# Patient Record
Sex: MALE | Employment: OTHER | URBAN - METROPOLITAN AREA
[De-identification: names, ages, dates, MRNs, and addresses within clinical notes are randomized per-mention and may not be internally consistent; named-entity substitution may affect disease eponyms.]

---

## 2021-08-23 ENCOUNTER — HOSPITAL ENCOUNTER (OUTPATIENT)
Dept: WOUND CARE | Age: 86
Discharge: HOME | End: 2021-08-23
Payer: MEDICARE

## 2021-08-23 VITALS
HEIGHT: 69 IN | HEART RATE: 60 BPM | RESPIRATION RATE: 18 BRPM | TEMPERATURE: 98.4 F | WEIGHT: 135 LBS | DIASTOLIC BLOOD PRESSURE: 62 MMHG | SYSTOLIC BLOOD PRESSURE: 125 MMHG | BODY MASS INDEX: 19.99 KG/M2

## 2021-08-23 DIAGNOSIS — E44.0 MODERATE PROTEIN-CALORIE MALNUTRITION (HCC): ICD-10-CM

## 2021-08-23 DIAGNOSIS — L89.613 PRESSURE INJURY OF RIGHT HEEL, STAGE 3 (HCC): ICD-10-CM

## 2021-08-23 PROBLEM — E46 MALNUTRITION (HCC): Status: ACTIVE | Noted: 2021-08-23

## 2021-08-23 PROBLEM — N18.9 CKD (CHRONIC KIDNEY DISEASE): Status: ACTIVE | Noted: 2021-08-23

## 2021-08-23 PROBLEM — I50.9 CHF (CONGESTIVE HEART FAILURE) (HCC): Status: ACTIVE | Noted: 2021-08-23

## 2021-08-23 PROCEDURE — 99203 OFFICE O/P NEW LOW 30 MIN: CPT | Performed by: SURGERY

## 2021-08-23 PROCEDURE — 11042 DBRDMT SUBQ TIS 1ST 20SQCM/<: CPT

## 2021-08-23 PROCEDURE — 74011000250 HC RX REV CODE- 250: Performed by: SURGERY

## 2021-08-23 PROCEDURE — 11042 DBRDMT SUBQ TIS 1ST 20SQCM/<: CPT | Performed by: SURGERY

## 2021-08-23 PROCEDURE — 99203 OFFICE O/P NEW LOW 30 MIN: CPT

## 2021-08-23 RX ADMIN — Medication: at 14:49

## 2021-08-24 ENCOUNTER — DOCUMENTATION ONLY (OUTPATIENT)
Dept: WOUND CARE | Age: 86
End: 2021-08-24

## 2021-08-24 NOTE — H&P
[facility]  HISTORY AND PHYSICAL    Name:  Raimundo Duque  MR#:  [id number]  :  1934  ACCOUNT #:  [de-identified]  ADMIT DATE:  2021      HISTORY OF PRESENT ILLNESS:  The patient is an 66-year-old man who was referred to [address] [place] [address] regarding a pressure ulcer on the right heel. The patient was hospitalized in 2021 and underwent transfemoral heart valve clipping. He is reported to have developed the pressure site at that time. Presently, the patient can walk short distances in his house with a walker. He goes up and downstairs once per day. The patient is fairly weak. His appetite is only fair. The patient does not have history of diabetes. He does have history of congestive heart failure. He also has history of MI about 7 years ago and had coronary angioplasty at that time. He is reported to have severe coronary artery disease. The patient does have history of pacemaker and defibrillator insertion. The patient is able to sleep in bed at night. He has been instructed to elevate his legs much of the time during the day because of leg swelling. The patient apparently reportedly has some type of padded boot for both feet, which he wears at night. The patient and his wife did not bring them with him at this visit. The patient has never smoked. Reported weight 135 pounds, height 5 feet 9 inches. PHYSICAL EXAMINATION:  VITAL SIGNS:  Blood pressure 125/62, pulse 60, respirations 18, temperature 98.4. GENERAL:  The patient is an alert and elderly man, in no acute distress. HEAD AND NECK:  Examination showed no jaundice. LUNGS:  Clear bilaterally without rales, rhonchi or wheezes. HEART:  Regular with 2/6 systolic murmur heard best on the right sternal border. NEUROLOGIC  The patient is alert and oriented. He moves all extremities equally.   Facial movement is symmetrical.  Speech is normal.  EXTREMITIES:  Examination of the left lower extremity reveals 2+ dorsalis pedis pulse. There is a slight callus at the left posterior heel, which was reported to be a site of a previous eschar. Examination of the left lower extremity revealed trace pitting edema. There were 2+ right dorsalis pedis and posterior tibial pulses. There was a wound on the posterior right heel, which was 2.5 x 3 x 0.4 cm in dimension with loosely adherent necrotic eschar. Surrounding skin was free of erythema. There was no undermining. I recommended debridement of the necrotic material on the patient's wound. Risks versus benefits were reviewed. The site was marked. 15-blade scalpel and scissors and forceps were utilized. I removed the necrotic eschar down to bleeding tissue. Small portions of adherent nonviable-appearing fat were left in place. I explained to the patient and his wife that complete offloading of the posterior right heel was essential at all times. If he was sitting upright, he could have his feet flat on the floor without limit. If he is either lying down or sitting in a chair with legs elevated, he would need to have a pillow under both calves to float both heels off any surface. He can utilize the hospital supplied boots at night when sleeping, but his wife should confirm that this allows for floating the heels off of any surface. Dressing ordered:  Apply Santyl nickel thick over the ulcer, cover with normal saline and moist gauze and dry gauze and roll gauze from below ankle to above ankle. Dressing to be changed daily. The patient is able to bathe with the dressing removed. Home health will be ordered three times per week for assistance with wound care. The patient's wife will change the dressing on days when Home Health is not coming. The patient and his wife will bring the padded boots at their next visit with me in the [address] [place] [address]. The patient will follow up in [address] [place] [address] in 3 weeks.     FINAL DIAGNOSIS:  Pressure ulcer posterior right heel, stage III.       Morelia Bradley MD      [id number]  D:  08/23/2021 17:14  T:  08/23/2021 21:44  JOB #:  [id number]

## 2021-09-13 ENCOUNTER — HOSPITAL ENCOUNTER (OUTPATIENT)
Dept: WOUND CARE | Age: 86
Discharge: HOME | End: 2021-09-13
Payer: MEDICARE

## 2021-09-13 VITALS
SYSTOLIC BLOOD PRESSURE: 125 MMHG | DIASTOLIC BLOOD PRESSURE: 62 MMHG | TEMPERATURE: 98.4 F | RESPIRATION RATE: 18 BRPM | HEART RATE: 60 BPM

## 2021-09-13 DIAGNOSIS — L89.613 PRESSURE INJURY OF RIGHT HEEL, STAGE 3 (HCC): ICD-10-CM

## 2021-09-13 PROCEDURE — 99213 OFFICE O/P EST LOW 20 MIN: CPT | Performed by: SURGERY

## 2021-09-13 PROCEDURE — 74011000250 HC RX REV CODE- 250: Performed by: SURGERY

## 2021-09-13 PROCEDURE — 99213 OFFICE O/P EST LOW 20 MIN: CPT

## 2021-09-13 RX ORDER — PRAMIPEXOLE DIHYDROCHLORIDE 0.12 MG/1
0.12 TABLET ORAL
COMMUNITY

## 2021-09-13 RX ORDER — ATORVASTATIN CALCIUM 80 MG/1
80 TABLET, FILM COATED ORAL
COMMUNITY

## 2021-09-13 RX ORDER — CYANOCOBALAMIN 1000 UG/ML
1000 INJECTION, SOLUTION INTRAMUSCULAR; SUBCUTANEOUS ONCE
COMMUNITY

## 2021-09-13 RX ORDER — CLONAZEPAM 1 MG/1
1 TABLET ORAL
COMMUNITY

## 2021-09-13 RX ORDER — FINASTERIDE 5 MG/1
5 TABLET, FILM COATED ORAL DAILY
COMMUNITY

## 2021-09-13 RX ORDER — TORSEMIDE 5 MG/1
10 TABLET ORAL DAILY
COMMUNITY
End: 2022-01-31

## 2021-09-13 RX ORDER — AMIODARONE HYDROCHLORIDE 200 MG/1
200 TABLET ORAL DAILY
COMMUNITY

## 2021-09-13 RX ORDER — ASCORBIC ACID 500 MG
1000 TABLET ORAL DAILY
COMMUNITY
End: 2022-01-31

## 2021-09-13 RX ORDER — METHENAMINE HIPPURATE 1000 MG/1
1 TABLET ORAL DAILY
COMMUNITY
End: 2022-01-31

## 2021-09-13 RX ORDER — LEVOTHYROXINE SODIUM 125 UG/1
125 TABLET ORAL
COMMUNITY

## 2021-09-13 RX ADMIN — Medication: at 13:45

## 2021-09-13 NOTE — PROGRESS NOTES
HISTORY OF PRESENT ILLNESS:  The patient is an 79-year-old man who was referred to [address] [place] [address] regarding a pressure ulcer on the right heel. The patient was hospitalized in 04/2021 and underwent transfemoral heart valve clipping. He is reported to have developed the pressure site at that time.     Presently, the patient can walk short distances in his house with a walker. He goes up and downstairs once per day. The patient is fairly weak. His appetite is only fair.     The patient does not have history of diabetes. He does have history of congestive heart failure. He also has history of MI about 7 years ago and had coronary angioplasty at that time. He is reported to have severe coronary artery disease. The patient does have history of pacemaker and defibrillator insertion.     The patient is able to sleep in bed at night. He has been instructed to elevate his legs much of the time during the day because of leg swelling.     The patient reportedly has some type of padded boot for both feet, which he wears at night. The patient and his wife did not bring them with him at this visit.     The patient has never smoked. The patient has developed a rash.     Reported weight 135 pounds, height 5 feet 9 inches.     PHYSICAL EXAMINATION:    GENERAL:  The patient is an alert and elderly man who appears chronically ill,  in no acute distress. EXTREMITIES:      The patient has red rash on anterior right lower leg not in an area of his dressing. Examination of the left lower extremity reveals 2+ dorsalis pedis pulse. There is a slight callus at the left posterior heel, which was reported to be a site of a previous eschar.     Examination of the left lower extremity revealed trace pitting edema. There were 2+ right dorsalis pedis and posterior tibial pulses.   There was a wound on the posterior right heel, which was 2.1 x 3 x 0.3 cm in dimension with granulation 40% and exposed yellow fat and minimal slough. Skin edges well sealed to the wound. Surrounding skin was free of erythema. There was no undermining.           Right heel wound improved. Patient should see his primary MD regarding rash. There is no erythema or rash around the wound, so I don't think the rash is likely to be a reaction to the Santyl.     Continue offloading the right heel. The patient's wife appears to be doing a good job with this.     Dressing ordered:  Apply Santyl nickel thick over the ulcer, cover with normal saline and moist gauze and dry gauze and roll gauze from below ankle to above ankle. Dressing to be changed daily.     The patient is able to bathe with the dressing removed.     Home health will be ordered three times per week for assistance with wound care.   The patient's wife will change the dressing on days when [facility] is not coming.     The patient and his wife will bring the padded boots at their next visit with me in the [address] [place] [address].     The patient will follow up in [address] [place] [address] in 2-3 weeks.     FINAL DIAGNOSIS:  Pressure ulcer posterior right heel, stage III.    W16.880        Lucius Braga MD

## 2021-09-13 NOTE — WOUND CARE
09/13/21 1430   Wound Heel Lateral;Right #1 08/23/21   Date First Assessed/Time First Assessed: 08/23/21 1431   Present on Hospital Admission: Yes  Wound Approximate Age at First Assessment (Weeks): (c)   Primary Wound Type: Pressure Injury  Location: Heel  Wound Location Orientation: Lateral;Right  Wound. .. Dressing/Treatment Gauze dressing/dressing sponge  (santyl  nickel thick dry gauze rolled gauze, netting)   Discharge Condition: Stable     Pain: 0    Ambulatory Status: Walker     Discharge Destination: home    Transportation: Car    Accompanied by: spouse    Discharge instructions reviewed with Family/Caregiver  and copy or written instructions have been provided. All questions/concerns have been addressed at this time.

## 2021-09-13 NOTE — DISCHARGE INSTRUCTIONS
Discharge Instructions for  [facility]  [address] [place], [address] [address]  Telephone: [phone]    NAME:  Hamzah Mario OF BIRTH:  1934  DATE:  8/23/2021    [x] Home Healthcare: TO:  ALL ABOUT CARE       Utilize heel boots to offload heels if they do not press on wound or use pillow to float heel    Wound Cleansing:   Do not scrub or use excessive force. DO NOT SOAK  Cleanse wound prior to applying a clean dressing with:  [] Normal Saline   [] Keep Wound Dry in Shower      [] Wound Cleanser   [x] Cleanse wound with Mild Soap & Water    [x] May Shower at Discharge: remove dressing 1st, redress wound right after  [] Do not shower  [] cleanse with baby shampoo lather leave 2-3 then rinse    Topical Treatments:  Do not apply lotions, creams, or ointments to wound bed unless directed. [] Apply moisturizing lotion A&D ointment to skin surrounding the wound prior to dressing change.  [] Bactroban/Mupirocin  [] Gentamicin ointment    [] Gentian violet to wound bed and periwound  [] Other:     Dressings:                   Wound Location Right Lateral Heel     Apply Primary Dressing:      [x] Santyl - nickel thick     Cover and Secure with:  [x] Gauze moistened with saline, then Dry Gauze on top [] ABD [] exudry     [] Mauricio [x] Kerlix/Rolled Gauze [] Mepilex Border  [] Ace Wrap [x] Roll Tape    [] Other:        Change dressing:   [x] Daily       [] Every Other Day   [] Three times per week  [] Once a week   [] Do Not Change Dressing     [x] Other: wound care three times a week with home health     Edema Control: Every morning immediately when getting up should be applied to affected leg(s) from mid foot to knee making sure to cover the heel. Remove every night before going to bed if desired.   Apply: [] Compression Stocking      []Right Leg           []Left Leg   [] Tubigrip {tubigrip:97624}   [] Right Leg Single Layer  [] Right Leg Double Layer [] Left Leg Single Layer [] Left Leg Double Layer    Compression: Do not get leg(s) with wrap wet. If wraps become too tight call the center or completely remove the wrap. Apply: [] Three Layer Compression Wrap  []RightLeg []Left Leg  [] Four layer Compression Wrap      []RightLeg []Left Leg   []  Unna's boot                                  [] Right Leg   [] Left Leg       [x] Elevate leg(s) above the level of the heart when sitting. [x] Avoid prolonged standing in one place. Off-Loading:   [x] Off-loading when [] walking  [x] in bed [x] sitting       HEEL BOOTS OR FLOAT ON PILLOWS- heel should not touch bed or ottoman at all    Dietary:  [x] Diet as tolerated [] Diabetic Diet   [x] Increase Protein: examples (Meat, cheese, eggs, greek yogurt, fish, nuts)   [] Miguel Angel Therapeutic Nutrition Powder  [x] Other: Bowl of ice cream every afternoon per Dr. Toni Perla to increase body weight and nutritional status  [] Dial a Dietician : Call [facility] at [phone] enter code [phone]) when prompted. M-F 9am-5pm EST. Return Appointment:  [] Nurse Visit at wound center in *** days   [x] Return Appointment: With Dr. Gooden Heading in 3 week(s)  [] Ordered tests:      Electronically signed on 8/23/2021 at [address] [place] [address] Information: Should you experience any significant changes in your wound(s) or have questions about your wound care, please contact the [facility] at [address] [place] [address] 8:00 am - 4:30. If you need help with your wound outside these hours and cannot wait until we are again available, contact your PCP or go to the hospital emergency room. PLEASE NOTE: IF YOU ARE UNABLE TO OBTAIN WOUND SUPPLIES, CONTINUE TO USE THE SUPPLIES YOU HAVE AVAILABLE UNTIL YOU ARE ABLE TO REACH US. IT IS MOST IMPORTANT TO KEEP THE WOUND COVERED AT ALL TIMES.      Physician Signature:_______________________  Dr. Gooden Heading

## 2021-09-13 NOTE — WOUND CARE
09/13/21 1341   Anesthetic   Anesthetic 4% Lidocaine Liquid Topical   Right Leg Edema Point of Measurement   Leg circumference 30.5 cm   Ankle circumference 23 cm   RLE Peripheral Vascular    Capillary Refill Less than/equal to 3 seconds   Color Red   Temperature Cool   Pedal Pulse Palpable   Wound Heel Lateral;Right #1 08/23/21   Date First Assessed/Time First Assessed: 08/23/21 1431   Present on Hospital Admission: Yes  Wound Approximate Age at First Assessment (Weeks): (c)   Primary Wound Type: Pressure Injury  Location: Heel  Wound Location Orientation: Lateral;Right  Wound. ..    Wound Image    Wound Length (cm) 2.1 cm   Wound Width (cm) 3 cm   Wound Depth (cm) 0.3 cm   Wound Surface Area (cm^2) 6.3 cm^2   Change in Wound Size % 16   Wound Volume (cm^3) 1.89 cm^3   Wound Healing % 37   Wound Assessment Slough;Pink/red  (exudate)   Drainage Amount Moderate   Drainage Description Serosanguinous   Wound Odor None   Shivani-Wound/Incision Assessment Blanchable erythema   Edges Flat/open edges   Pain 1   Pain Scale 1 Numeric (0 - 10)   Pain Intensity 1 0     Visit Vitals  /62 (BP 1 Location: Right upper arm, BP Patient Position: Sitting)   Pulse 60   Temp 98.4 °F (36.9 °C)   Resp 18

## 2021-10-04 ENCOUNTER — HOSPITAL ENCOUNTER (OUTPATIENT)
Dept: WOUND CARE | Age: 86
Discharge: HOME | End: 2021-10-04
Payer: MEDICARE

## 2021-10-04 VITALS
HEART RATE: 76 BPM | DIASTOLIC BLOOD PRESSURE: 79 MMHG | TEMPERATURE: 98.9 F | RESPIRATION RATE: 18 BRPM | SYSTOLIC BLOOD PRESSURE: 122 MMHG

## 2021-10-04 DIAGNOSIS — L89.613 PRESSURE INJURY OF RIGHT HEEL, STAGE 3 (HCC): ICD-10-CM

## 2021-10-04 PROCEDURE — 74011000250 HC RX REV CODE- 250: Performed by: SURGERY

## 2021-10-04 PROCEDURE — 99213 OFFICE O/P EST LOW 20 MIN: CPT

## 2021-10-04 PROCEDURE — 99213 OFFICE O/P EST LOW 20 MIN: CPT | Performed by: SURGERY

## 2021-10-04 RX ORDER — LIDOCAINE HYDROCHLORIDE 20 MG/ML
JELLY TOPICAL
Status: COMPLETED | OUTPATIENT
Start: 2021-10-04 | End: 2021-10-04

## 2021-10-04 RX ADMIN — LIDOCAINE HYDROCHLORIDE: 20 JELLY TOPICAL at 14:00

## 2021-10-04 NOTE — WOUND CARE
10/04/21 1452   Wound Heel Lateral;Right #1 08/23/21   Date First Assessed/Time First Assessed: 08/23/21 1431   Present on Hospital Admission: Yes  Wound Approximate Age at First Assessment (Weeks): (c)   Primary Wound Type: Pressure Injury  Location: Heel  Wound Location Orientation: Lateral;Right  Wound. .. Dressing/Treatment ABD pad;Gauze dressing/dressing sponge;Roll gauze;Tape/Soft cloth adhesive tape  (Santyl )   Discharge Condition: Stable     Pain: 0    Ambulatory Status: Walker     Discharge Destination: Home     Transportation: Car    Accompanied by: Family/Caregiver     Discharge instructions reviewed with Family/Caregiver  and copy or written instructions have been provided. All questions/concerns have been addressed at this time.

## 2021-10-04 NOTE — WOUND CARE
10/04/21 1357   Wound Heel Lateral;Right #1 08/23/21   Date First Assessed/Time First Assessed: 08/23/21 1431   Present on Hospital Admission: Yes  Wound Approximate Age at First Assessment (Weeks): (c)   Primary Wound Type: Pressure Injury  Location: Heel  Wound Location Orientation: Lateral;Right  Wound. ..    Wound Image    Wound Length (cm) 2 cm   Wound Width (cm) 2.5 cm   Wound Depth (cm) 0.3 cm   Wound Surface Area (cm^2) 5 cm^2   Change in Wound Size % 33.33   Wound Volume (cm^3) 1.5 cm^3   Wound Healing % 50   Wound Assessment Slough   Drainage Amount Moderate   Drainage Description Serosanguinous   Wound Odor None   Shivani-Wound/Incision Assessment Blanchable erythema   Edges Flat/open edges   Wound Thickness Description Full thickness     Visit Vitals  /79 (BP Patient Position: Sitting)   Pulse 76   Temp 98.9 °F (37.2 °C)   Resp 18

## 2021-10-04 NOTE — PROGRESS NOTES
HISTORY OF PRESENT ILLNESS:  The patient is an 15-year-old man who was referred to [address] [place] [address] regarding a pressure ulcer on the right heel.  The patient was hospitalized in 04/2021 and underwent transfemoral heart valve clipping.  He is reported to have developed the pressure site at that time. The patient was first seen at the [address] [place] [address] on 8/23/2021. Presently, the patient can walk short distances in his house with a walker. [facility] goes up and downstairs once per day.  The patient is fairly weak.  His appetite is fair, improved a little.     The patient does not have history of diabetes. [facility] does have history of congestive heart failure.  He also has history of MI about 7 years ago and had coronary angioplasty at that time. [facility] is reported to have severe coronary artery disease.  The patient does have history of pacemaker and defibrillator insertion. His cardiologist is Dr Jeff Silva.     The patient is able to sleep in bed at night. [facility] has been instructed to elevate his legs much of the time during the day because of leg swelling.     The patient reportedly has some type of padded boot for both feet, which he wears during the day.  At night, the patient reports he has neuropathy which prevents him from using any padded boot. The patient and his wife did not bring his booties with him at this visit.     The patient has never smoked. Current dressing as of 8/23/2021:  Apply Santyl nickel thick over the ulcer, cover with normal saline and moist gauze and dry gauze and roll gauze from below ankle to above ankle.  Dressing to be changed daily. Prior rash has subsided.       Reported weight 135 pounds, height 5 feet 9 inches.     PHYSICAL EXAMINATION:     GENERAL:  The patient is an alert and elderly man who appears chronically ill,  in no acute distress. Looks stronger than first visit.     EXTREMITIES:       Examination of the left lower extremity reveals 2+ dorsalis pedis pulse. Arnoldo Miranda is a slight callus at the left posterior heel, which was reported to be a site of a previous eschar.     Examination of the left lower extremity revealed trace pitting edema.  There were 2+ right dorsalis pedis and posterior tibial pulses.  There was a wound on the posterior right heel, which was 2 x 2.5 x 0.3 cm in dimension with granulation 50% and exposed yellow fat and minimal slough. Skin edges well sealed to the wound.  Surrounding skin was free of erythema.  There was no undermining.            Right heel wound improving slowly.                Continue offloading the right heel. The patient's wife appears to be doing a good job with this.     Dressing ordered:  Apply Santyl nickel thick over the ulcer, cover with normal saline and moist gauze and dry gauze and roll gauze from below ankle to above ankle.  Dressing to be changed daily.     The patient is able to bathe with the dressing removed.  [address] [place] [address] will be ordered three times per week for assistance with wound care.  The patient's wife will change the dressing on days when [facility] is not coming.     The patient and his wife were instructed to bring the padded boots at their next visit with me in the [address] [place] [address].     The patient will follow up in [address] [place] [address] in 3 weeks.     FINAL DIAGNOSIS:  Pressure ulcer posterior right heel, stage III.     L[address]. [address]  Talita Crespo MD

## 2021-10-04 NOTE — DISCHARGE INSTRUCTIONS
Discharge Instructions for  [facility]  [address] [place], [address] [address]  Telephone: [phone]    NAME:  Lilliana Edmond OF BIRTH:  1934  DATE:  9/13/2021    [x] Home Healthcare: TO:  ALL ABOUT CARE       Utilize heel boots to offload heels if they do not press on wound or use pillow to float heel    Wound Cleansing:   Do not scrub or use excessive force. DO NOT SOAK  Cleanse wound prior to applying a clean dressing with:  [] Normal Saline   [] Keep Wound Dry in Shower      [] Wound Cleanser   [x] Cleanse wound with Mild Soap & Water    [x] May Shower at Discharge: remove dressing 1st, redress wound right after  [] Do not shower  [] cleanse with baby shampoo lather leave 2-3 then rinse    Topical Treatments:  Do not apply lotions, creams, or ointments to wound bed unless directed. [] Apply moisturizing lotion A&D ointment to skin surrounding the wound prior to dressing change.  [] Bactroban/Mupirocin  [] Gentamicin ointment    [] Gentian violet to wound bed and periwound  [] Other:     Dressings:                   Wound Location Right Lateral Heel     Apply Primary Dressing:      [x] Santyl - nickel thick     Cover and Secure with:  [x] Gauze moistened with saline, then Dry Gauze on top [] ABD [] exudry     [] Mauricio [x] Kerlix/Rolled Gauze [] Mepilex Border  [] Ace Wrap [x] Roll Tape    [] Other:        Change dressing:   [x] Daily       [] Every Other Day   [] Three times per week  [] Once a week   [] Do Not Change Dressing     [x] Other: wound care three times a week with home health     Edema Control: Every morning immediately when getting up should be applied to affected leg(s) from mid foot to knee making sure to cover the heel. Remove every night before going to bed if desired.   Apply: [] Compression Stocking      []Right Leg           []Left Leg   [] Tubigrip {tubigrip:12956}   [] Right Leg Single Layer  [] Right Leg Double Layer [] Left Leg Single Layer [] Left Leg Double Layer    Compression: Do not get leg(s) with wrap wet. If wraps become too tight call the center or completely remove the wrap. Apply: [] Three Layer Compression Wrap  []RightLeg []Left Leg  [] Four layer Compression Wrap      []RightLeg []Left Leg   []  Unna's boot                                  [] Right Leg   [] Left Leg       [x] Elevate leg(s) above the level of the heart when sitting. [x] Avoid prolonged standing in one place. Off-Loading:   [x] Off-loading when [] walking  [x] in bed [x] sitting       HEEL BOOTS OR FLOAT ON PILLOWS- heel should not touch bed or ottoman at all    Dietary:  [x] Diet as tolerated [] Diabetic Diet   [x] Increase Protein: examples (Meat, cheese, eggs, greek yogurt, fish, nuts)   [] Miguel Angel Therapeutic Nutrition Powder  [x] Other: Bowl of ice cream every afternoon per Dr. Melisa Claire to increase body weight and nutritional status  [] Dial a Dietician : Call [employer] at [phone] enter code ([phone]) when prompted. M-F 9am-5pm EST. Return Appointment:  [] Nurse Visit at wound center in *** days   [x] Return Appointment: With Dr. Ruth Stephenson in 2-3 week(s)  [] Ordered tests:      Electronically signed on 9/13/2021    [address] [place] [address] Information: Should you experience any significant changes in your wound(s) or have questions about your wound care, please contact the [address] [place] [address] at [address] [place] [address] 8:00 am - 4:30. If you need help with your wound outside these hours and cannot wait until we are again available, contact your PCP or go to the hospital emergency room. PLEASE NOTE: IF YOU ARE UNABLE TO OBTAIN WOUND SUPPLIES, CONTINUE TO USE THE SUPPLIES YOU HAVE AVAILABLE UNTIL YOU ARE ABLE TO REACH US. IT IS MOST IMPORTANT TO KEEP THE WOUND COVERED AT ALL TIMES.      Physician Signature:_______________________  Dr. Ruth Stephenson

## 2021-10-25 ENCOUNTER — HOSPITAL ENCOUNTER (OUTPATIENT)
Dept: WOUND CARE | Age: 86
Discharge: HOME | End: 2021-10-25
Payer: MEDICARE

## 2021-10-25 VITALS
SYSTOLIC BLOOD PRESSURE: 106 MMHG | RESPIRATION RATE: 18 BRPM | HEART RATE: 71 BPM | TEMPERATURE: 97.6 F | DIASTOLIC BLOOD PRESSURE: 67 MMHG

## 2021-10-25 DIAGNOSIS — S41.112A LACERATION OF MULTIPLE SITES OF LEFT UPPER EXTREMITY, INITIAL ENCOUNTER: ICD-10-CM

## 2021-10-25 DIAGNOSIS — L89.613 PRESSURE INJURY OF RIGHT HEEL, STAGE 3 (HCC): ICD-10-CM

## 2021-10-25 DIAGNOSIS — S51.011A LACERATION OF SKIN OF RIGHT ELBOW, INITIAL ENCOUNTER: ICD-10-CM

## 2021-10-25 PROCEDURE — 99214 OFFICE O/P EST MOD 30 MIN: CPT | Performed by: SURGERY

## 2021-10-25 PROCEDURE — 99214 OFFICE O/P EST MOD 30 MIN: CPT

## 2021-10-25 PROCEDURE — 74011000250 HC RX REV CODE- 250: Performed by: SURGERY

## 2021-10-25 RX ADMIN — Medication: at 14:21

## 2021-10-25 NOTE — DISCHARGE INSTRUCTIONS
Discharge Instructions for  [facility]  [place] [address], [address] [facility]  Telephone: [phone]    NAME:  Rebekah Fabry OF BIRTH:  1934  DATE:  10/4/2021    [x] Home Healthcare: TO:  ALL ABOUT CARE       Utilize heel boots to offload heels if they do not press on wound or use pillow to float heel    Wound Cleansing:   Do not scrub or use excessive force. DO NOT SOAK  Cleanse wound prior to applying a clean dressing with:  [] Normal Saline   [] Keep Wound Dry in Shower      [] Wound Cleanser   [x] Cleanse wound with Mild Soap & Water    [x] May Shower at Discharge: remove dressing 1st, redress wound right after  [] Do not shower  [] cleanse with baby shampoo lather leave 2-3 then rinse    Topical Treatments:  Do not apply lotions, creams, or ointments to wound bed unless directed. [x] Apply moisturizing lotion A&D ointment to skin surrounding the wound prior to dressing change.  [] Bactroban/Mupirocin  [] Gentamicin ointment    [] Gentian violet to wound bed and periwound  [] Other:     Dressings:                   Wound Location Right Lateral Heel     Apply Primary Dressing:      [x] Santyl - nickel thick     Cover and Secure with:  [x] Gauze moistened with saline, then Dry Gauze on top [] ABD [] exudry     [] Mauricio [x] Kerlix/Rolled Gauze [] Mepilex Border  [] Ace Wrap [x] Roll Tape    [] Other:        Change dressing:   [x] Daily       [] Every Other Day   [] Three times per week  [] Once a week   [] Do Not Change Dressing     [x] Other: wound care three times a week with home health     Edema Control: Every morning immediately when getting up should be applied to affected leg(s) from mid foot to knee making sure to cover the heel. Remove every night before going to bed if desired.   Apply: [] Compression Stocking      []Right Leg           []Left Leg   [] Tubigrip {tubigrip:[id number]}   [] Right Leg Single Layer  [] Right Leg Double Layer [] Left Leg Single Layer [] Left Leg Double Layer    Compression: Do not get leg(s) with wrap wet. If wraps become too tight call the center or completely remove the wrap. Apply: [] Three Layer Compression Wrap  []RightLeg []Left Leg  [] Four layer Compression Wrap      []RightLeg []Left Leg   []  Unna's boot                                  [] Right Leg   [] Left Leg       [x] Elevate leg(s) above the level of the heart when sitting. [x] Avoid prolonged standing in one place. Off-Loading:   [x] Off-loading when [] walking  [x] in bed [x] sitting       HEEL BOOTS OR FLOAT ON PILLOWS- heel should not touch bed or ottoman at all    Dietary:  [x] Diet as tolerated [] Diabetic Diet   [x] Increase Protein: examples (Meat, cheese, eggs, greek yogurt, fish, nuts)   [] Miguel Angel Therapeutic Nutrition Powder  [x] Other: Bowl of ice cream every afternoon per Dr. Hannah Vance to increase body weight and nutritional status  [] Dial a Dietician : Call "[employer] at [phone] enter code [phone]) when prompted. M-F 9am-5pm EST. Return Appointment:  [] Nurse Visit at wound center in *** days   [x] Return Appointment: With Dr. Alo Sahu in 3 week(s), bring foam boot to next visit please  [] Ordered tests:      Electronically signed on 10/4/2021    [address] [place] [address] Information: Should you experience any significant changes in your wound(s) or have questions about your wound care, please contact the [facility] at [address] [place] [address] Street 8:00 am - 4:30. If you need help with your wound outside these hours and cannot wait until we are again available, contact your PCP or go to the hospital emergency room. PLEASE NOTE: IF YOU ARE UNABLE TO OBTAIN WOUND SUPPLIES, CONTINUE TO USE THE SUPPLIES YOU HAVE AVAILABLE UNTIL YOU ARE ABLE TO REACH US. IT IS MOST IMPORTANT TO KEEP THE WOUND COVERED AT ALL TIMES.      Physician Signature:_______________________  Dr. Alo Sahu

## 2021-10-25 NOTE — WOUND CARE
10/25/21 1416   Right Leg Edema Point of Measurement   Leg circumference 36.5 cm   Ankle circumference 27 cm   RLE Peripheral Vascular    Capillary Refill Less than/equal to 3 seconds   Color Mottled   Temperature Warm   Pedal Pulse Palpable   Wound Hand Left;Dorsal #2   Date First Assessed/Time First Assessed: 10/25/21 1413   Present on Hospital Admission: Yes  Location: Hand  Wound Location Orientation: Left;Dorsal  Wound Description: #2   Wound Image    Wound Length (cm) 5 cm   Wound Width (cm) 4.3 cm   Wound Depth (cm) 0.1 cm   Wound Surface Area (cm^2) 21.5 cm^2   Wound Volume (cm^3) 2.15 cm^3   Wound Assessment [place]/red;Slough   Drainage Amount Moderate   Drainage Description Serosanguinous   Wound Odor None   Shivani-Wound/Incision Assessment Ecchymosis   Edges Flat/open edges   Wound Elbow Left #3   Date First Assessed/Time First Assessed: 10/25/21 1414   Present on Hospital Admission: Yes  Location: Elbow  Wound Location Orientation: Left  Wound Description: #3   Wound Image    Wound Length (cm) 0.5 cm   Wound Width (cm) 0.5 cm   Wound Depth (cm) 0.2 cm   Wound Surface Area (cm^2) 0.25 cm^2   Wound Volume (cm^3) 0.05 cm^3   Wound Assessment Slough   Drainage Amount Moderate   Drainage Description Serosanguinous   Wound Odor None   Shivani-Wound/Incision Assessment Intact;Fragile   Edges Epibole (rolled edges)   Wound Arm Left;Proximal #4   Date First Assessed/Time First Assessed: 10/25/21 1414   Present on Hospital Admission: Yes  Location: Arm  Wound Location Orientation: Left;Proximal  Wound Description: #4   Wound Image    Wound Length (cm) 1 cm   Wound Width (cm) 1.4 cm   Wound Depth (cm) 0.1 cm   Wound Surface Area (cm^2) 1.4 cm^2   Wound Volume (cm^3) 0.14 cm^3   Wound Assessment Slough;Pink/red   Drainage Amount Moderate   Drainage Description Serosanguinous   Wound Odor None   Shivani-Wound/Incision Assessment Intact;Fragile   Wound Arm Left;Mid #5   Date First Assessed/Time First Assessed: 10/25/21 1414 Present on Hospital Admission: Yes  Location: Arm  Wound Location Orientation: Left;Mid  Wound Description: #5   Wound Image    Wound Length (cm) 3 cm   Wound Width (cm) 1.2 cm   Wound Depth (cm) 0.1 cm   Wound Surface Area (cm^2) 3.6 cm^2   Wound Volume (cm^3) 0.36 cm^3   Wound Assessment [place]/red;Slough   Drainage Amount Moderate   Drainage Description Serosanguinous   Wound Odor None   Shivani-Wound/Incision Assessment Fragile   Wound Arm Left;Distal #6   Date First Assessed/Time First Assessed: 10/25/21 1415   Present on Hospital Admission: Yes  Location: Arm  Wound Location Orientation: Left;Distal  Wound Description: #6   Wound Image    Wound Length (cm) 2.5 cm   Wound Width (cm) 1.4 cm   Wound Depth (cm) 0.1 cm   Wound Surface Area (cm^2) 3.5 cm^2   Wound Volume (cm^3) 0.35 cm^3   Wound Assessment Pink/red   Drainage Amount Moderate   Drainage Description Serosanguinous   Wound Odor None   Shivani-Wound/Incision Assessment Intact   Wound Elbow Right #7   Date First Assessed/Time First Assessed: 10/25/21 1416   Present on Hospital Admission: Yes  Location: Elbow  Wound Location Orientation: Right  Wound Description: #7   Wound Image    Wound Etiology Skin Tear   Wound Length (cm) 1.5 cm   Wound Width (cm) 1.2 cm   Wound Depth (cm) 0.1 cm   Wound Surface Area (cm^2) 1.8 cm^2   Wound Volume (cm^3) 0.18 cm^3   Wound Assessment Pink/red   Drainage Amount Moderate   Drainage Description Serosanguinous   Wound Odor None   Shivain-Wound/Incision Assessment Fragile; Intact   Wound Heel Lateral;Right #1 08/23/21   Date First Assessed/Time First Assessed: 08/23/21 1431   Present on Hospital Admission: Yes  Wound Approximate Age at First Assessment (Weeks): (c)   Primary Wound Type: Pressure Injury  Location: Heel  Wound Location Orientation: Lateral;Right  Wound. ..    Wound Image    Wound Length (cm) 1.3 cm   Wound Width (cm) 2.4 cm   Wound Depth (cm) 0.3 cm   Wound Surface Area (cm^2) 3.12 cm^2   Change in Wound Size % 58.4 Wound Volume (cm^3) 0.936 cm^3   Wound Healing % 69   Wound Assessment Slough;Pink/red   Drainage Amount Moderate   Drainage Description Serosanguinous   Wound Odor None   Shivani-Wound/Incision Assessment Maceration     Visit Vitals  /67   Pulse 71   Temp 97.6 °F (36.4 °C)   Resp 18

## 2021-10-25 NOTE — PROGRESS NOTES
HISTORY OF PRESENT ILLNESS:  The patient is an 80-year-old man who was referred to [address] [place] [address] regarding a pressure ulcer on the right heel.  The patient was hospitalized in 04/2021 and underwent transfemoral heart valve clipping.  He is reported to have developed the pressure site at that time.     The patient was first seen at the [address] [place] [address] on 8/23/2021.     Presently, the patient can walk short distances in his house with a walker. Ezequiel Gao goes up and downstairs once per day.  The patient is fairly weak.  His appetite is only fair, improved a little. His wife notes more leg edema in both legs. The patient fell at home a few days ago, resulting in multiple superficial lacerations. He fell today earlier and struck his right elbow. Denies syncope.     The patient does not have history of diabetes. Ezequiel Gao does have history of congestive heart failure.  He also has history of MI about 7 years ago and had coronary angioplasty at that time. Ezequiel Gao is reported to have severe coronary artery disease.  The patient does have history of pacemaker and defibrillator insertion. His cardiologist is Dr Juliet Ghosh.     The patient is able to sleep in bed at night. Ezequiel Gao has been instructed to elevate his legs much of the time during the day because of leg swelling.     The patient reportedly has some type of padded boot for both feet, which he wears during the day.  At night, the patient reports he has neuropathy which prevents him from using any padded boot.  The patient's wife is floating the heels.     The patient has never smoked.     Current dressing as of 8/23/2021:  Apply Santyl nickel thick over the ulcer, cover with normal saline and moist gauze and dry gauze and roll gauze from below ankle to above ankle.  Dressing to be changed daily.     Prior rash has subsided.        Reported weight 135 pounds, height 5 feet 9 inches.     PHYSICAL EXAMINATION:     GENERAL:  The patient is an alert and elderly man who appears chronically ill,  in no acute distress. Looks stronger than first visit.     EXTREMITIES:      Upper extremities:  Multiple superficial lacerations / skin avulsions both upper extremities. One 1.5 loose skin flap excised.     Examination of the left lower extremity reveals 2+ dorsalis pedis pulse.  1-2 + pitting edema from knees down. There is a slight callus at the left posterior heel, which was reported to be a site of a previous eschar.     Examination of the left lower extremity revealed 1-2 + pitting edema from knee down. Franco Mosier were 2+ right dorsalis pedis and posterior tibial pulses.  There was a wound on the posterior right heel, which was 1.3 x 2.4 x 0.3 cm in dimension with granulation 50% and exposed yellow fat and minimal slough.  Skin edges well sealed to the wound.  Surrounding skin was free of erythema.  There was no undermining.           Right heel wound improving slowly. Increased leg edema. Several falls.               Discussed falls. Does not appear to be syncope. Recommended to discuss with primary MD.    Increased leg edema - to see his cardiologist tomorrow. Continue offloading the right heel.  The patient's wife appears to be doing a good job with this.     Dressing ordered:  Apply Santyl nickel thick over the left heel ulcer, cover with normal saline and moist gauze and dry gauze and roll gauze from below ankle to above ankle.  Dressing to be changed daily. Dressings ordered for arms and left hand:  Xeroform over wounds, cover with dry guaze, roll gauze and sleeve net. Change 3 times per week.     The patient is able to bathe with the dressing removed.  [address] will be ordered three times per week for assistance with wound care.  The patient's wife will change the dressing on days when [facility] is not coming.          The patient will follow up in [address] [place] [address] in 3 weeks.     FINAL DIAGNOSIS:  Pressure ulcer posterior right heel, stage III. Lacerations right elbow, left arm     L89.613, S51.011A, S41.112A        Sommer Sen MD

## 2021-10-25 NOTE — WOUND CARE
10/25/21 1440   Wound Hand Left;Dorsal #2   Date First Assessed/Time First Assessed: 10/25/21 1413   Present on Hospital Admission: Yes  Location: Hand  Wound Location Orientation: Left;Dorsal  Wound Description: #2   Dressing/Treatment Xeroform;Gauze dressing/dressing sponge;Roll gauze   Wound Elbow Left #3   Date First Assessed/Time First Assessed: 10/25/21 1414   Present on Hospital Admission: Yes  Location: Elbow  Wound Location Orientation: Left  Wound Description: #3   Dressing/Treatment Xeroform;Gauze dressing/dressing sponge;Roll gauze   Wound Arm Left;Proximal #4   Date First Assessed/Time First Assessed: 10/25/21 1414   Present on Hospital Admission: Yes  Location: Arm  Wound Location Orientation: Left;Proximal  Wound Description: #4   Dressing/Treatment Xeroform;Gauze dressing/dressing sponge;Roll gauze   Wound Arm Left;Mid #5   Date First Assessed/Time First Assessed: 10/25/21 1414   Present on Hospital Admission: Yes  Location: Arm  Wound Location Orientation: Left;Mid  Wound Description: #5   Dressing/Treatment Xeroform;Gauze dressing/dressing sponge;Roll gauze   Wound Arm Left;Distal #6   Date First Assessed/Time First Assessed: 10/25/21 1415   Present on Hospital Admission: Yes  Location: Arm  Wound Location Orientation: Left;Distal  Wound Description: #6   Dressing/Treatment Xeroform;Gauze dressing/dressing sponge;Roll gauze   Wound Elbow Right #7   Date First Assessed/Time First Assessed: 10/25/21 1416   Present on Hospital Admission: Yes  Location: Elbow  Wound Location Orientation: Right  Wound Description: #7   Dressing/Treatment Xeroform;Gauze dressing/dressing sponge;Roll gauze   Wound Heel Lateral;Right #1 08/23/21   Date First Assessed/Time First Assessed: 08/23/21 1431   Present on Hospital Admission: Yes  Wound Approximate Age at First Assessment (Weeks): (c)   Primary Wound Type: Pressure Injury  Location: Heel  Wound Location Orientation: Lateral;Right  Wound. ..    Dressing/Treatment Moist to dry;Gauze dressing/dressing sponge;Roll gauze  (Santyl nickel thick to wound base)   Discharge Condition: Stable     Pain: 0    Ambulatory Status: Wheelchair     Discharge Destination: Home     Transportation: Car    Accompanied by: Self  and Family/Caregiver     Discharge instructions reviewed with Patient and Family/Caregiver  and copy or written instructions have been provided. All questions/concerns have been addressed at this time.

## 2021-12-13 ENCOUNTER — HOSPITAL ENCOUNTER (OUTPATIENT)
Dept: WOUND CARE | Age: 86
Discharge: HOME | End: 2021-12-13
Payer: MEDICARE

## 2021-12-13 VITALS — TEMPERATURE: 97.5 F | SYSTOLIC BLOOD PRESSURE: 127 MMHG | HEART RATE: 60 BPM | DIASTOLIC BLOOD PRESSURE: 69 MMHG

## 2021-12-13 DIAGNOSIS — S41.112A SKIN TEAR OF LEFT UPPER ARM WITHOUT COMPLICATION, INITIAL ENCOUNTER: ICD-10-CM

## 2021-12-13 DIAGNOSIS — R60.0 BILATERAL LEG EDEMA: ICD-10-CM

## 2021-12-13 DIAGNOSIS — L89.613 PRESSURE INJURY OF RIGHT HEEL, STAGE 3 (HCC): ICD-10-CM

## 2021-12-13 PROBLEM — S51.011A: Status: RESOLVED | Noted: 2021-10-25 | Resolved: 2021-12-13

## 2021-12-13 PROCEDURE — 74011000250 HC RX REV CODE- 250: Performed by: SURGERY

## 2021-12-13 PROCEDURE — 99214 OFFICE O/P EST MOD 30 MIN: CPT | Performed by: SURGERY

## 2021-12-13 PROCEDURE — 99214 OFFICE O/P EST MOD 30 MIN: CPT

## 2021-12-13 RX ORDER — SILODOSIN 8 MG/1
8 CAPSULE ORAL
COMMUNITY
End: 2022-01-31

## 2021-12-13 RX ORDER — FUROSEMIDE 40 MG/1
40 TABLET ORAL 2 TIMES DAILY
COMMUNITY

## 2021-12-13 RX ADMIN — Medication: at 15:10

## 2021-12-13 NOTE — WOUND CARE
12/13/21 1607   Wound Heel Left #8   Date First Assessed/Time First Assessed: 12/13/21 1500   Present on Hospital Admission: Yes  Location: Heel  Wound Location Orientation: Left  Wound Description: #8   Dressing/Treatment Gauze dressing/dressing sponge  (santyl and saline moist gauze)   Wound Elbow Left; Anterior #9   Date First Assessed/Time First Assessed: 12/13/21 1501   Present on Hospital Admission: Yes  Location: Elbow  Wound Location Orientation: Left; Anterior  Wound Description: #9   Dressing/Treatment Foam; Xeroform   Discharge Condition: Stable     Pain: 0    Ambulatory Status: Walker     Discharge Destination: Home     Transportation: Car    Accompanied by: Self and spouse    Discharge instructions reviewed with Family/Caregiver  and copy or written instructions have been provided. All questions/concerns have been addressed at this time.

## 2021-12-13 NOTE — WOUND CARE
12/13/21 1501   Anesthetic   Anesthetic 4% Lidocaine Liquid Topical   Right Leg Edema Point of Measurement   Leg circumference 39.5 cm   Ankle circumference 27.5 cm   Left Leg Edema Point of Measurement   Leg circumference 39.5 cm   Ankle circumference 27 cm   LLE Peripheral Vascular    Capillary Refill Less than/equal to 3 seconds   Color Appropriate for race   Temperature Cool   Pedal Pulse Palpable   RLE Peripheral Vascular    Capillary Refill Less than/equal to 3 seconds   Color Appropriate for race   Temperature Cool   Pedal Pulse Palpable   Wound Heel Left #8   Date First Assessed/Time First Assessed: 12/13/21 1500   Present on Hospital Admission: Yes  Location: Heel  Wound Location Orientation: Left  Wound Description: #8   Wound Image    Wound Length (cm) 0.4 cm   Wound Width (cm) 0.5 cm   Wound Depth (cm) 0.1 cm   Wound Surface Area (cm^2) 0.2 cm^2   Wound Volume (cm^3) 0.02 cm^3   Wound Assessment Slough   Drainage Amount Moderate   Drainage Description Serous   Wound Odor None   Shivani-Wound/Incision Assessment Intact   Edges Flat/open edges   Wound Elbow Left; Anterior #9   Date First Assessed/Time First Assessed: 12/13/21 1501   Present on Hospital Admission: Yes  Location: Elbow  Wound Location Orientation: Left; Anterior  Wound Description: #9   Wound Image    Wound Length (cm) 3.4 cm   Wound Width (cm) 2 cm   Wound Depth (cm) 0.1 cm   Wound Surface Area (cm^2) 6.8 cm^2   Wound Volume (cm^3) 0.68 cm^3   Wound Assessment Pink/red   Drainage Amount Moderate   Drainage Description Sanguineous   Wound Odor None   Shivani-Wound/Incision Assessment Ecchymosis; Intact   Edges Flat/open edges   Wound Heel Lateral;Right #1 08/23/21   Date First Assessed/Time First Assessed: 08/23/21 1431   Present on Hospital Admission: Yes  Wound Approximate Age at First Assessment (Weeks): (c)   Primary Wound Type: Pressure Injury  Location: Heel  Wound Location Orientation: Lateral;Right  Wound. ..    Wound Image    Wound Length (cm) 4 cm   Wound Width (cm) 3 cm   Wound Depth (cm) 0.3 cm   Wound Surface Area (cm^2) 12 cm^2   Change in Wound Size % -60   Wound Volume (cm^3) 3.6 cm^3   Wound Healing % -20   Undermining Starts ___ O'Clock 3 o'clock   Undermining Ends ___ O'Clock 7 o'clock   Undermining Maximum Distance (cm) 0.5 cm   Wound Assessment Slough  (dry blood)   Drainage Amount Moderate   Drainage Description Serosanguinous   Wound Odor None   Shivani-Wound/Incision Assessment Blanchable erythema; Edematous   Edges Flat/open edges   Wound Thickness Description Full thickness   [REMOVED] Wound Hand Left; Dorsal #2   Final Assessment Date/Final Assessment Time: 12/13/21 1508  Date First Assessed/Time First Assessed: 10/25/21 1413   Present on Hospital Admission: Yes  Location: Hand  Wound Location Orientation: Left; Dorsal  Wound Description: #2  Wound Outcome: He. .. Wound Image    Wound Length (cm) 0 cm   Wound Width (cm) 0 cm   Wound Depth (cm) 0 cm   Wound Surface Area (cm^2) 0 cm^2   Change in Wound Size % 100   Wound Volume (cm^3) 0 cm^3   Wound Healing % 100   [REMOVED] Wound Elbow Left #3   Final Assessment Date/Final Assessment Time: 12/13/21 1508  Date First Assessed/Time First Assessed: 10/25/21 1414   Present on Hospital Admission: Yes  Location: Elbow  Wound Location Orientation: Left  Wound Description: #3  Wound Outcome: Healed   Wound Image    Wound Length (cm) 0 cm   Wound Width (cm) 0 cm   Wound Depth (cm) 0 cm   Wound Surface Area (cm^2) 0 cm^2   Change in Wound Size % 100   Wound Volume (cm^3) 0 cm^3   Wound Healing % 100   [REMOVED] Wound Arm Left; Proximal #4   Final Assessment Date/Final Assessment Time: 12/13/21 1508  Date First Assessed/Time First Assessed: 10/25/21 1414   Present on Hospital Admission: Yes  Location: Arm  Wound Location Orientation: Left; Proximal  Wound Description: #4  Wound Outcome: H...    Wound Image    Wound Length (cm) 0 cm   Wound Width (cm) 0 cm   Wound Depth (cm) 0 cm   Wound Surface Area (cm^2) 0 cm^2   Change in Wound Size % 100   Wound Volume (cm^3) 0 cm^3   Wound Healing % 100   [REMOVED] Wound Arm Left; Mid #5   Final Assessment Date/Final Assessment Time: 12/13/21 1509  Date First Assessed/Time First Assessed: 10/25/21 1414   Present on Hospital Admission: Yes  Location: Arm  Wound Location Orientation: Left; Mid  Wound Description: #5  Wound Outcome: Healed   Wound Image    Wound Length (cm) 0 cm   Wound Width (cm) 0 cm   Wound Depth (cm) 0 cm   Wound Surface Area (cm^2) 0 cm^2   Change in Wound Size % 100   Wound Volume (cm^3) 0 cm^3   Wound Healing % 100   [REMOVED] Wound Arm Left; Distal #6   Final Assessment Date/Final Assessment Time: 12/13/21 1509  Date First Assessed/Time First Assessed: 10/25/21 1415   Present on Hospital Admission: Yes  Location: Arm  Wound Location Orientation: Left; Distal  Wound Description: #6  Wound Outcome: Healed   Wound Image    Wound Length (cm) 0 cm   Wound Width (cm) 0 cm   Wound Depth (cm) 0 cm   Wound Surface Area (cm^2) 0 cm^2   Change in Wound Size % 100   Wound Volume (cm^3) 0 cm^3   Wound Healing % 100   [REMOVED] Wound Elbow Right #7   Final Assessment Date/Final Assessment Time: 12/13/21 1509  Date First Assessed/Time First Assessed: 10/25/21 1416   Present on Hospital Admission: Yes  Location: Elbow  Wound Location Orientation: Right  Wound Description: #7  Wound Outcome: Healed   Wound Image    Wound Length (cm) 0 cm   Wound Width (cm) 0 cm   Wound Depth (cm) 0 cm   Wound Surface Area (cm^2) 0 cm^2   Change in Wound Size % 100   Wound Volume (cm^3) 0 cm^3   Wound Healing % 100     Visit Vitals  /69 (BP 1 Location: Left upper arm, BP Patient Position: Lying)   Pulse 60   Temp 97.5 °F (36.4 °C)

## 2021-12-13 NOTE — PROGRESS NOTES
HISTORY OF PRESENT ILLNESS:  The patient is an 54-year-old man who was referred to [address] [place] [address] regarding a pressure ulcer on the right heel.  The patient was hospitalized in 04/2021 and underwent transfemoral heart valve clipping.  He is reported to have developed the pressure site at that time.     The patient was first seen at [facility] on 8/23/2021. He was last seen 10/25/2021, then hospitalized at [facility] for edema and cardiac issues. He was apparently there for a number of weeks. He had reduction in swelling with diuresis, but his wife says he has regained much of the swelling. In the hospital, his right heel ulcer worsened. He also developed skin tear at IV dressing site.     Presently, the patient can walk minimally with PT in his house with a walker.   The patient is fairly weak.  His appetite is fairly good.     The patient does not have history of diabetes. Amparo Hill does have history of congestive heart failure.  He also has history of MI about 7 years ago and had coronary angioplasty at that time. Amparo Hill is reported to have severe coronary artery disease.  The patient does have history of pacemaker and defibrillator insertion.  His cardiologist is Dr Bipin Hernandez.     The patient is able to sleep in bed at night. Amparo Hill has been instructed to elevate his legs much of the time during the day because of leg swelling.     The patient reportedly has some type of padded boot for both feet, which he wears during the day.  At night, the patient reports he has neuropathy which prevents him from using any padded boot. The patient's wife is floating the heels.     His wife performed excellent wound care in the past and was very fastidious about offloading.     The patient has never smoked.             Reported weight 135 pounds, height 5 feet 9 inches.     PHYSICAL EXAMINATION:     GENERAL:  The patient is an alert and elderly man who appears chronically ill,  in no acute distress.       EXTREMITIES:       Upper extremities:  Just proximal to left antecubital skin fold - shallow ulcer 3.4 x 2 x 0.1 cm with pink base.     Examination of the left lower extremity reveals 2+ dorsalis pedis pulse.  2 + pitting edema from groins down. There is a slight callus at the left posterior heel, which was reported to be a site of a previous eschar.     Examination of the right lower extremity revealed 2 + pitting edema from groin down. Karyna Srinath were 2+ right dorsalis pedis and posterior tibial pulses.  There was a wound on the posterior right heel, which was 4 x 3 x 0.3 cm in dimension with granulation 30% and necrotic tissue and yellow fat.  Bulla skin at periphery is undermined. Bulla skin at periphery of right heel wound excised with scissors.           Right heel ulcer worse. Marked bilateral leg edema. Skin tear left arm.                   Continue offloading the right heel.  The patient's wife has done a good job with this.     Dressing ordered:  Apply Santyl nickel thick over the right heel ulcer, cover with normal saline and moist gauze and dry gauze and roll gauze from below ankle to above ankle.  Dressing to be changed daily.     Dressings ordered left arm:  Xeroform over wound, cover with mepilex foam dressing; change every 3 days.     The patient is able to bathe with the dressing removed.     Home Health  three times per week for assistance with wound care.  The patient's wife will change the dressing on days when [facility] is not coming.           The patient will follow up in [address] [place] [address] in 2 weeks.     FINAL DIAGNOSIS:  Pressure ulcer posterior right heel, stage III.   Skin tear, left arm     L89.072,  S41.696T        Jacob Dias MD

## 2021-12-13 NOTE — DISCHARGE INSTRUCTIONS
Discharge Instructions for  [facility]  [place] [address], [address] [facility] [address]  Telephone: [phone]    NAME:  Camille Garces OF BIRTH:  1934  DATE:  10/25/2021    [x] Home Healthcare: TO:  ALL ABOUT CARE       Utilize heel boots to offload heels if they do not press on wound or use pillow to float heel    Wound Cleansing:   Do not scrub or use excessive force. DO NOT SOAK  Cleanse wound prior to applying a clean dressing with:  [] Normal Saline   [] Keep Wound Dry in Shower      [] Wound Cleanser   [x] Cleanse wound with Mild Soap & Water    [x] May Shower at Discharge: remove dressing 1st, redress wound right after  [] Do not shower  [] cleanse with baby shampoo lather leave 2-3 then rinse    Topical Treatments:  Do not apply lotions, creams, or ointments to wound bed unless directed. [x] Apply moisturizing lotion A&D ointment to skin surrounding the wound prior to dressing change.  [] Bactroban/Mupirocin  [] Gentamicin ointment    [] Gentian violet to wound bed and periwound  [] Other:       Dressings:  Wound Location- all skin tears to bilateral arms    Apply Primary Dressing: Xeroform, gauze, roll gauze, tape, netting to hold in place- 3 times a week and prn    Dressings:                   Wound Location Right Lateral Heel     Apply Primary Dressing:      [x] Santyl - nickel thick     Cover and Secure with:  [x] Gauze moistened with saline, then Dry Gauze on top [] ABD [] exudry     [] Mauricio [x] Kerlix/Rolled Gauze [] Mepilex Border  [] Ace Wrap [x] Roll Tape    [] Other:        Change dressing:   [x] Daily       [] Every Other Day   [] Three times per week  [] Once a week   [] Do Not Change Dressing     [x] Other: wound care three times a week with home health     Edema Control: Every morning immediately when getting up should be applied to affected leg(s) from mid foot to knee making sure to cover the heel.   Remove every night before going to bed if desired. Apply: [] Compression Stocking      []Right Leg           []Left Leg   [] Tubigrip {tubigrip:09068}   [] Right Leg Single Layer  [] Right Leg Double Layer                              [] Left Leg Single Layer [] Left Leg Double Layer    Compression: Do not get leg(s) with wrap wet. If wraps become too tight call the center or completely remove the wrap. Apply: [] Three Layer Compression Wrap  []RightLeg []Left Leg  [] Four layer Compression Wrap      []RightLeg []Left Leg   []  Unna's boot                                  [] Right Leg   [] Left Leg       [x] Elevate leg(s) above the level of the heart when sitting. [x] Avoid prolonged standing in one place. Off-Loading:   [x] Off-loading when [] walking  [x] in bed [x] sitting       HEEL BOOTS OR FLOAT ON PILLOWS- heel should not touch bed or ottoman at all    Dietary:  [x] Diet as tolerated [] Diabetic Diet   [x] Increase Protein: examples (Meat, cheese, eggs, greek yogurt, fish, nuts)   [] Miguel Angel Therapeutic Nutrition Powder  [x] Other: Bowl of ice cream every afternoon per Dr. Aide Singh to increase body weight and nutritional status  [] Dial a Dietician : Call [employer] at [phone] enter code ([phone]) when prompted. M-F 9am-5pm EST. Return Appointment:  [] Nurse Visit at wound center in *** days   [x] Return Appointment: With Dr. Rogers Riggins in 3 week(s), bring foam boot to next visit please  [] Ordered tests:      Electronically signed on 10/25/2021    [address] [place] [address] Information: Should you experience any significant changes in your wound(s) or have questions about your wound care, please contact the [facility] Main at [address] [place] [address] Street 8:00 am - 4:30. If you need help with your wound outside these hours and cannot wait until we are again available, contact your PCP or go to the hospital emergency room.    PLEASE NOTE: IF YOU ARE UNABLE TO OBTAIN WOUND SUPPLIES, CONTINUE TO USE THE SUPPLIES YOU HAVE AVAILABLE UNTIL YOU ARE ABLE TO REACH US. IT IS MOST IMPORTANT TO KEEP THE WOUND COVERED AT ALL TIMES.      Physician Signature:_______________________  Dr. Kristi Booker

## 2021-12-27 ENCOUNTER — HOSPITAL ENCOUNTER (OUTPATIENT)
Dept: WOUND CARE | Age: 86
Discharge: HOME | End: 2021-12-27
Payer: MEDICARE

## 2021-12-27 VITALS — TEMPERATURE: 98.6 F | SYSTOLIC BLOOD PRESSURE: 123 MMHG | DIASTOLIC BLOOD PRESSURE: 58 MMHG | RESPIRATION RATE: 18 BRPM

## 2021-12-27 DIAGNOSIS — L89.613 PRESSURE INJURY OF RIGHT HEEL, STAGE 3 (HCC): ICD-10-CM

## 2021-12-27 PROBLEM — L89.622 PRESSURE INJURY OF LEFT HEEL, STAGE 2 (HCC): Status: ACTIVE | Noted: 2021-12-27

## 2021-12-27 PROBLEM — S41.112A SKIN TEAR OF LEFT UPPER ARM WITHOUT COMPLICATION: Status: RESOLVED | Noted: 2021-12-13 | Resolved: 2021-12-27

## 2021-12-27 PROBLEM — L89.151 PRESSURE INJURY OF SACRAL REGION, STAGE 1: Status: ACTIVE | Noted: 2021-12-27

## 2021-12-27 PROCEDURE — 11042 DBRDMT SUBQ TIS 1ST 20SQCM/<: CPT | Performed by: SURGERY

## 2021-12-27 PROCEDURE — 11042 DBRDMT SUBQ TIS 1ST 20SQCM/<: CPT

## 2021-12-27 PROCEDURE — 74011000250 HC RX REV CODE- 250: Performed by: SURGERY

## 2021-12-27 RX ADMIN — Medication: at 13:07

## 2021-12-27 NOTE — WOUND CARE
12/27/21 1358   Wound Heel Left #8   Date First Assessed/Time First Assessed: 12/13/21 1500   Present on Hospital Admission: Yes  Location: Heel  Wound Location Orientation: Left  Wound Description: #8   Dressing/Treatment Silicone border   Wound Heel Lateral;Right #1 08/23/21   Date First Assessed/Time First Assessed: 08/23/21 1431   Present on Hospital Admission: Yes  Wound Approximate Age at First Assessment (Weeks): (c)   Primary Wound Type: Pressure Injury  Location: Heel  Wound Location Orientation: Lateral;Right  Wound. .. Dressing/Treatment Moist to dry; Roll gauze  (Santyl)   Discharge Condition: Stable     Pain: 0    Ambulatory Status: Walker     Discharge Destination: Home     Transportation: Car    Accompanied by: Self  and Family/Caregiver     Discharge instructions reviewed with Patient and Family/Caregiver  and copy or written instructions have been provided. All questions/concerns have been addressed at this time.

## 2021-12-27 NOTE — PROGRESS NOTES
HISTORY OF PRESENT ILLNESS:  The patient is an 24-year-old man who was referred to [address] [place] [address] regarding a pressure ulcer on the right heel.  The patient was hospitalized in 04/2021 and underwent transfemoral heart valve clipping.  He is reported to have developed the pressure site at that time.     The patient was first seen at [facility] on 8/23/2021. He was last seen 10/25/2021, then hospitalized at [facility] for edema and cardiac issues. He was apparently there for a number of weeks. He had reduction in swelling with diuresis, but his wife says he has regained much of the swelling. In the hospital, his right heel ulcer worsened.     Presently, the patient can walk minimally with PT in his house with a walker.   The patient is fairly weak.  His appetite is fairly good.     The patient does not have history of diabetes.  He does have history of congestive heart failure.  He also has history of MI about 7 years ago and had coronary angioplasty at that time. Jose Sanchez is reported to have severe coronary artery disease.  The patient does have history of pacemaker and defibrillator insertion.  His cardiologist is Dr Delgado King.     The patient is able to sleep in bed at night. Jose Sanchez has been instructed to elevate his legs much of the time during the day because of leg swelling.     The patient reportedly has some type of padded boot for both feet, which he wears during the day.  At night, the patient reports he has neuropathy which prevents him from using any padded boot. The patient's wife is floating the heels. He spends a lot of time in a recliner.     His wife performed excellent wound care in the past and was very fastidious about offloading.     The patient has never smoked.     Patient's wife reports irritation in sacral region.              Reported weight 135 pounds, height 5 feet 9 inches.     PHYSICAL EXAMINATION:     GENERAL:  The patient is an alert and elderly man who appears chronically ill,  in no acute distress.       EXTREMITIES:       Upper extremities:  arm ulcer healed. Sacrococcygeal region in midline - reddened blanchable area 4 cm across, no skin break.     Examination of the left lower extremity reveals 2+ dorsalis pedis pulse.  2 + pitting edema from thigh down.  There is a dry wound, 2 x 1.3 x 0.1 cm.     Examination of the right lower extremity revealed 2 + pitting edema from thigh down. Dannial Ketan were 2+ right dorsalis pedis and posterior tibial pulses.  There was a wound on the posterior right heel, which was 4 x 2.5 x 0.3 cm in dimension with granulation 20% and necrotic tissue and yellow fat.                  Right heel ulcer necrotic eschar weparating. Pressure injury stage 2 left heel. Marked bilateral leg edema. Skin tear left arm healed. Sacral pressure injury stage 1.         Right heel ulcer debrided - see separate note.           Offload both heals.  The patient's wife has done a good job with this. Offload sacrum - avoid lying on back in recliner. Sit directly upright on foam pad (which he has) or lie turned toward side 45-90 degrees. Change position at least every 2 hours.     Dressing ordered:  Apply Santyl nickel thick over the right heel ulcer, cover with normal saline and moist gauze and dry gauze and roll gauze from below ankle to above ankle.  Dressing to be changed daily.     Dressings ordered left heel:  Foam border dressing, change 3 times per week.     Dressing for sacral region:  Heart shaped sacral foam dressing; change every 2-3 days and prn.     The patient is able to bathe with the dressings removed.     Home Health  three times per week for assistance with wound care.  The patient's wife will change the dressing on days when [facility] is not coming.           The patient will follow up in [address] [place] [address] in 3 weeks.     FINAL DIAGNOSIS:  Pressure ulcer posterior right heel, stage 3.  Pressure ulcer left heel stage 2, pressure injury sacrum stage  1.   Skin tear, left arm     L89.613,  L89.622, L89.151        Silviano Brar MD

## 2021-12-27 NOTE — WOUND CARE
12/27/21 1301   Anesthetic   Anesthetic 4% Lidocaine Liquid Topical   Right Leg Edema Point of Measurement   Leg circumference 37.5 cm   Ankle circumference 27.5 cm   Left Leg Edema Point of Measurement   Leg circumference 37.5 cm   Ankle circumference 27 cm   LLE Peripheral Vascular    Capillary Refill Less than/equal to 3 seconds   Color Appropriate for race   Temperature Warm   Pedal Pulse Palpable   RLE Peripheral Vascular    Capillary Refill Less than/equal to 3 seconds   Color Appropriate for race   Temperature Warm   Pedal Pulse Palpable   Wound Heel Left #8   Date First Assessed/Time First Assessed: 12/13/21 1500   Present on Hospital Admission: Yes  Location: Heel  Wound Location Orientation: Left  Wound Description: #8   Wound Image    Wound Etiology Deep Tissue/Injury   Wound Length (cm) 2 cm   Wound Width (cm) 1.3 cm   Wound Depth (cm) 0.1 cm   Wound Surface Area (cm^2) 2.6 cm^2   Change in Wound Size % -1200   Wound Volume (cm^3) 0.26 cm^3   Wound Healing % -1200   Wound Assessment Other (Comment)  (purple and dried exudate)   Drainage Amount Moderate   Drainage Description Serosanguinous   Wound Odor None   Shivani-Wound/Incision Assessment Intact;Fragile   Wound Heel Lateral;Right #1 08/23/21   Date First Assessed/Time First Assessed: 08/23/21 1431   Present on Hospital Admission: Yes  Wound Approximate Age at First Assessment (Weeks): (c)   Primary Wound Type: Pressure Injury  Location: Heel  Wound Location Orientation: Lateral;Right  Wound. .. Wound Image    Wound Length (cm) 4 cm   Wound Width (cm) 2.5 cm   Wound Depth (cm) 0.3 cm   Wound Surface Area (cm^2) 10 cm^2   Change in Wound Size % -33.33   Wound Volume (cm^3) 3 cm^3   Wound Healing % 0   Wound Assessment Slough;Pink/red   Drainage Amount Moderate   Drainage Description Serosanguinous   Wound Odor Mild   Shivani-Wound/Incision Assessment Blanchable erythema   Edges Flat/open edges   Wound Elbow Left;  Anterior #9   Date First Assessed/Time First Assessed: 12/13/21 1501   Present on Hospital Admission: Yes  Location: Elbow  Wound Location Orientation: Left;  Anterior  Wound Description: #9   Wound Image    Wound Length (cm) 0 cm   Wound Width (cm) 0 cm   Wound Depth (cm) 0 cm   Wound Surface Area (cm^2) 0 cm^2   Change in Wound Size % 100   Wound Volume (cm^3) 0 cm^3   Wound Healing % 100   Wound Assessment Epithelialization   Drainage Amount None   Wound Odor None   Shivani-Wound/Incision Assessment Intact     Visit Vitals  BP (!) 123/58 (BP 1 Location: Left upper arm, BP Patient Position: Sitting)   Temp 98.6 °F (37 °C)   Resp 18

## 2022-01-10 NOTE — DISCHARGE INSTRUCTIONS
Discharge Instructions for  [facility]  [place], [address] [facility] [address]  Telephone: [phone]    NAME:  Zeenat Valencia OF BIRTH:  1934  DATE:  12/13/2021    [x] Home Healthcare: TO:  ALL ABOUT CARE       Utilize heel boots to offload heels if they do not press on wound or use pillow to float heel    Wound Cleansing:   Do not scrub or use excessive force. DO NOT SOAK  Cleanse wound prior to applying a clean dressing with:  [] Normal Saline   [] Keep Wound Dry in Shower      [] Wound Cleanser   [x] Cleanse wound with Mild Soap & Water    [x] May Shower at Discharge: remove dressing 1st, redress wound right after  [] Do not shower  [] cleanse with baby shampoo lather leave 2-3 then rinse      Topical Treatments:  Do not apply lotions, creams, or ointments to wound bed unless directed. [x] Apply moisturizing lotion A&D ointment to skin surrounding the wound prior to dressing change. Dressings:  Wound Location- left anterior elbow    Apply Primary Dressing: Xeroform, mepilex foam boarder-three times a week    Dressings:                   Wound Location Right Lateral Heel     Apply Primary Dressing:      [x] Santyl - nickel thick     Cover and Secure with:  [x] Gauze moistened with saline, then Dry Gauze on top [] ABD [] exudry     [] Mauricio [x] Kerlix/Rolled Gauze [] Mepilex Border  [] Ace Wrap [x] Roll Tape    [] Other:        Change dressing:   [x] Daily       [] Every Other Day   [] Three times per week  [] Once a week   [] Do Not Change Dressing     [x] Other: wound care three times a week with home health  [x] Elevate leg(s) above the level of the heart when sitting. [x] Avoid prolonged standing in one place.     Off-Loading:   [x] Off-loading when [] walking  [x] in bed [x] sitting       HEEL BOOTS OR FLOAT ON PILLOWS- heel should not touch bed or ottoman at all    Dietary:  [x] Diet as tolerated [] Diabetic Diet   [x] Increase Protein: examples (Meat, cheese, eggs, greek yogurt, fish, nuts)   [] Miguel Angel Therapeutic Nutrition Powder  [x] Other: Bowl of ice cream every afternoon per Dr. Marianela Acevedo to increase body weight and nutritional status  [] Dial a Dietician : Call [employer] at [phone] enter code ([phone]) when prompted. M-F 9am-5pm EST. Return Appointment:  [] Nurse Visit at wound center in *** days   [x] Return Appointment: With Dr. Marline Merlin in 2 weeks  [] Ordered tests:      Electronically signed on 12/13/2021    [address] [place] [address] Information: Should you experience any significant changes in your wound(s) or have questions about your wound care, please contact the [facility] Main at [address] [place] [address] Street 8:00 am - 4:30. If you need help with your wound outside these hours and cannot wait until we are again available, contact your PCP or go to the hospital emergency room. PLEASE NOTE: IF YOU ARE UNABLE TO OBTAIN WOUND SUPPLIES, CONTINUE TO USE THE SUPPLIES YOU HAVE AVAILABLE UNTIL YOU ARE ABLE TO REACH US. IT IS MOST IMPORTANT TO KEEP THE WOUND COVERED AT ALL TIMES.      Physician Signature:_______________________  Dr. Marline Merlin

## 2022-01-31 ENCOUNTER — HOSPITAL ENCOUNTER (OUTPATIENT)
Dept: WOUND CARE | Age: 87
Discharge: HOME | End: 2022-01-31
Payer: MEDICARE

## 2022-01-31 VITALS
SYSTOLIC BLOOD PRESSURE: 110 MMHG | HEART RATE: 59 BPM | RESPIRATION RATE: 16 BRPM | DIASTOLIC BLOOD PRESSURE: 53 MMHG | TEMPERATURE: 97.6 F

## 2022-01-31 DIAGNOSIS — L89.622 PRESSURE INJURY OF LEFT HEEL, STAGE 2 (HCC): ICD-10-CM

## 2022-01-31 DIAGNOSIS — L89.152 PRESSURE INJURY OF SACRAL REGION, STAGE 2 (HCC): ICD-10-CM

## 2022-01-31 DIAGNOSIS — L89.613 PRESSURE INJURY OF RIGHT HEEL, STAGE 3 (HCC): ICD-10-CM

## 2022-01-31 PROCEDURE — 99214 OFFICE O/P EST MOD 30 MIN: CPT | Performed by: SURGERY

## 2022-01-31 PROCEDURE — 99213 OFFICE O/P EST LOW 20 MIN: CPT

## 2022-01-31 PROCEDURE — 74011000250 HC RX REV CODE- 250: Performed by: SURGERY

## 2022-01-31 RX ORDER — METOLAZONE 5 MG/1
5 TABLET ORAL
COMMUNITY

## 2022-01-31 RX ORDER — AMOXICILLIN AND CLAVULANATE POTASSIUM 500; 125 MG/1; MG/1
1 TABLET, FILM COATED ORAL DAILY
COMMUNITY
Start: 2022-01-28 | End: 2022-02-18

## 2022-01-31 RX ADMIN — Medication: at 13:44

## 2022-01-31 NOTE — PROGRESS NOTES
HISTORY OF PRESENT ILLNESS:  The patient is an 80-year-old man who was referred to [address] [place] [address] regarding a pressure ulcer on the right heel.  The patient was hospitalized in 04/2021 and underwent transfemoral heart valve clipping.  He is reported to have developed the pressure site at that time.     The patient was first seen at [facility] on 8/23/2021.  He was last seen 10/25/2021, then hospitalized at [facility] for edema and cardiac issues. Jaime Hansen was apparently there for a number of weeks. Jaime Hansen had reduction in swelling with diuresis, but his wife says he has regained much of the swelling.  In the hospital, his right heel ulcer worsened. Seen again 12/13/2021. He is taking lasix 40 bid and metolazone 5 mg MWF. He is being treated for UTI. He has been instructed because of UTI to drink two 16 ounce bottles of water per day in addition to his usual intake.     Presently, the patient can walk minimally with PT in his house with a walker.   The patient is fairly weak.  His appetite has decreased. The patient does not have history of diabetes. Jaime Hansen does have history of congestive heart failure.  He also has history of MI about 7 years ago and had coronary angioplasty at that time. Jaime Hansen is reported to have severe coronary artery disease.  The patient does have history of pacemaker and defibrillator insertion.  His cardiologist is Dr Makayla Shafer. The patient has never smoked.     The patient is able to sleep in bed at night.  He sleeps on his sides because of sacral discomfort.   He has been instructed to elevate his legs much of the time during the day because of leg swelling.     The patient reportedly has some type of padded boot for both feet, which he wears during the day.  At night, the patient reports he has neuropathy which prevents him from using any padded boot. The patient's wife is floating the heels.     He spends a lot of time in a recliner; there he is on his back in the recliner.     His wife performed excellent wound care in the past and was very fastidious about offloading.     Dressings as of 12/27/2021:  Right heel -  Apply Santyl nickel thick over the right heel ulcer, cover with normal saline and moist gauze and dry gauze and roll gauze from below ankle to above ankle.  Dressing to be changed daily.      Dressings ordered left heel:  Foam border dressing, change 3 times per week.      Dressing for sacral region:  Heart shaped sacral foam dressing; change every 2-3  days and prn.  -  Home Health sent much smaller foam dressings.                       Reported weight 135 pounds, height 5 feet 9 inches.     PHYSICAL EXAMINATION:     GENERAL:  The patient is an alert and elderly man who appears weak and chronically ill,  in no acute distress.       EXTREMITIES:         Sacral region - On right posterior buttock, shallow ulcer 0.5 x 0.5 x 0.1 cm partial thickness with pink base, surrounding irritated skin. Irritated skin on opposite left posterior buttock. Irritated skin in midline at sacrum without skin breakdown.     Examination of the left lower extremity reveals 2+ dorsalis pedis pulse.  2 + pitting edema from knee down.  There is a dry scabbed wound, 1.5 x 0.7 x 0.1 cm.     Examination of the right lower extremity revealed 2 + pitting edema from knee down. Estle Loya were 2+ right dorsalis pedis and posterior tibial pulses.  There was a wound on the posterior right heel, which was 3.5 x 2 x 0.3 cm in dimension with granulation 30% and  and yellow dry fat.                   Right heel pressure wound stage 3.  Pressure injury stage 2 left heel. Marked bilateral leg edema.    Sacral pressure injury stage 2.                Offload both heals.  The patient's wife has done a good job with this.     Offload sacrum - avoid lying on back in recliner. If in recliner, tilt off midline at least 30 degrees.   Sit directly upright on foam pad (which he has) or lie turned toward side 45-90 degrees. Change position at least every 2 hours.     Dressing ordered:  Right heel  -  Apply Santyl nickel thick over the right heel ulcer, cover with normal saline and moist gauze and dry gauze and roll gauze from below ankle to above ankle.  Dressing to be changed daily.        Dressings ordered left heel:  Foam border dressing, change 3     times per week.        Dressing for sacral region:  Xeroform over ulcerated area, heart    shaped sacral foam dressing; change every 2-3 days and prn.     The patient is able to bathe with the dressings removed.     Home Health  three times per week for assistance with wound care.  The patient's wife will change the dressing on days when Home Health is not coming.     As UTI has improved and edema remains severe, decrease extra water bottles from 2 down to 1 per day.           The patient will follow up in [address] [place] [address] in 4 weeks.     FINAL DIAGNOSIS:  Pressure ulcer posterior right heel, stage 3.  Pressure ulcer left heel stage 2, pressure injury sacrum stage  2.       L89.613,  W69.738, J82.126        Helen Rogers MD

## 2022-01-31 NOTE — DISCHARGE INSTRUCTIONS
Discharge Instructions for  [facility]  [place] [address], [address] [facility]  Telephone: [phone]    NAME:  Carlos Wilder OF BIRTH:  1934  DATE:  12/27/2021    [x] Home Healthcare: TO:  ALL ABOUT CARE       Utilize heel boots to offload heels if they do not press on wound, may use pillow to float heel as well    Reduce pressure to buttocks and sacrum by shifting weight and turning from side to side- may tuck a pillow under one hip and shift every to 2 hours. Sit upright instead of reclining. Wound Cleansing:   Do not scrub or use excessive force. DO NOT SOAK  Cleanse wound prior to applying a clean dressing with:  [x] Cleanse wound with Mild Soap & Water    [x] May Shower at Discharge: remove dressing 1st, redress wound right after    Topical Treatments:  Do not apply lotions, creams, or ointments to wound bed unless directed. [x] Apply moisturizing lotion A&D ointment to skin surrounding the wound prior to dressing change.      ***May utilize mepilex foam to sacrum for prevention***    Dressings:                Wound Location Left Heel           Apply Primary Dressing:      Cover and Secure with:  [] Gauze [] ABD  [] exudry     [] Mauricio [] Kerlix          [x] Mepilex Border  [] Ace Wrap [] Other:   [] Roll Tape       Change dressing:   [] Daily      [] Every Other Day   [x] Three times per week  [] Once a week   [] Do Not Change Dressing     [] Other:    Dressings:                   Wound Location Right Lateral Heel     Apply Primary Dressing:      [x] Santyl - nickel thick     Cover and Secure with:  [x] Gauze moistened with saline, then Dry Gauze on top [] ABD [] exudry     [] Mauricio [x] Kerlix/Rolled Gauze [] Mepilex Border  [] Ace Wrap [x] Roll Tape    [] Other:        Change dressing:   [x] Daily       [] Every Other Day   [] Three times per week  [] Once a week   [] Do Not Change Dressing     [x] Other: three times a week with home health    [x] Elevate leg(s) above the level of the heart when sitting. [x] Avoid prolonged standing in one place. Off-Loading:   [x] Off-loading when [] walking  [x] in bed [x] sitting       HEEL BOOTS OR FLOAT ON PILLOWS- heel should not touch bed or ottoman at all       Shift body weight while in recliner and in bed, try to sit upright when reclining    Dietary:  [x] Diet as tolerated   [x] Increase Protein: examples (Meat, cheese, eggs, greek yogurt, fish, nuts)   [] Miguel Angel Therapeutic Nutrition Powder  [x] Other: Bowl of ice cream every afternoon per Dr. Mine Gallego to increase body weight and nutritional status  [] Dial a Dietician : Call [employer] at [phone] enter code ([phone]) when prompted. M-F 9am-5pm EST. Return Appointment:  [] Nurse Visit at wound center in *** days   [x] Return Appointment: With Dr. Elías Ponce in 2 weeks  [] Ordered tests:      Electronically signed on 12/27/2021    [address] [place] [address] Information: Should you experience any significant changes in your wound(s) or have questions about your wound care, please contact the [facility] Main at [address] [place] [address] 8:00 am - 4:30. If you need help with your wound outside these hours and cannot wait until we are again available, contact your PCP or go to the hospital emergency room. PLEASE NOTE: IF YOU ARE UNABLE TO OBTAIN WOUND SUPPLIES, CONTINUE TO USE THE SUPPLIES YOU HAVE AVAILABLE UNTIL YOU ARE ABLE TO REACH US. IT IS MOST IMPORTANT TO KEEP THE WOUND COVERED AT ALL TIMES.      Physician Signature:_______________________  Dr. Elías Ponce

## 2022-01-31 NOTE — WOUND CARE
01/31/22 1426   Wound Buttocks Right #2   Date First Assessed/Time First Assessed: 01/31/22 1339   Present on Hospital Admission: Yes  Primary Wound Type: Skin Tear  Location: Buttocks  Wound Location Orientation: Right  Wound Description: #2   Dressing/Treatment Xeroform; Foam   Wound Heel Lateral;Right #1 08/23/21   Date First Assessed/Time First Assessed: 08/23/21 1431   Present on Hospital Admission: Yes  Wound Approximate Age at First Assessment (Weeks): (c)   Primary Wound Type: Pressure Injury  Location: Heel  Wound Location Orientation: Lateral;Right  Wound. .. Dressing/Treatment Foam  (Santyl)   Wound Heel Left #8   Date First Assessed/Time First Assessed: 12/13/21 1500   Present on Hospital Admission: Yes  Location: Heel  Wound Location Orientation: Left  Wound Description: #8   Dressing/Treatment Foam     Discharge Condition: Stable     Pain: 0    Ambulatory Status: Walker     Discharge Destination: Home     Transportation: Car    Accompanied by: Family/Caregiver     Discharge instructions reviewed with Family/Caregiver  and copy or written instructions have been provided. All questions/concerns have been addressed at this time.

## 2022-01-31 NOTE — WOUND CARE
01/31/22 1334   Right Leg Edema Point of Measurement   Leg circumference 36 cm   Ankle circumference 27 cm   Left Leg Edema Point of Measurement   Leg circumference 37.8 cm   Ankle circumference 27 cm   LLE Peripheral Vascular    Capillary Refill Less than/equal to 3 seconds   Color Red   Temperature Warm   Pedal Pulse Doppler   RLE Peripheral Vascular    Capillary Refill Less than/equal to 3 seconds   Color Red   Temperature Warm   Pedal Pulse Doppler   Wound Buttocks Right #2   Date First Assessed/Time First Assessed: 01/31/22 1339   Present on Hospital Admission: Yes  Primary Wound Type: Skin Tear  Location: Buttocks  Wound Location Orientation: Right  Wound Description: #2   Wound Image    Wound Etiology Skin Tear   Wound Length (cm) 0.5 cm   Wound Width (cm) 0.5 cm   Wound Depth (cm) 0.1 cm   Wound Surface Area (cm^2) 0.25 cm^2   Wound Volume (cm^3) 0.025 cm^3   Wound Assessment Slough;Pink/red   Drainage Amount Small   Drainage Description Serous   Wound Odor None   Shivani-Wound/Incision Assessment Intact; Blanchable erythema   Edges Flat/open edges   Wound Thickness Description Partial thickness   Wound Heel Lateral;Right #1 08/23/21   Date First Assessed/Time First Assessed: 08/23/21 1431   Present on Hospital Admission: Yes  Wound Approximate Age at First Assessment (Weeks): (c)   Primary Wound Type: Pressure Injury  Location: Heel  Wound Location Orientation: Lateral;Right  Wound. ..    Wound Image    Dressing Status Old drainage noted   Cleansed Cleansed with saline   Wound Length (cm) 3.5 cm   Wound Width (cm) 2 cm   Wound Depth (cm) 0.3 cm   Wound Surface Area (cm^2) 7 cm^2   Change in Wound Size % 6.67   Wound Volume (cm^3) 2.1 cm^3   Wound Healing % 30   Wound Assessment Slough;Pink/red;Epithelialization   Drainage Amount Moderate   Drainage Description Serosanguinous   Wound Odor None   Shivani-Wound/Incision Assessment Blanchable erythema   Edges Flat/open edges   Wound Heel Left #8   Date First Assessed/Time First Assessed: 12/13/21 1500   Present on Hospital Admission: Yes  Location: Heel  Wound Location Orientation: Left  Wound Description: #8   Wound Image    Dressing Status Clean;Dry; Intact   Wound Length (cm) 1.5 cm   Wound Width (cm) 0.7 cm   Wound Depth (cm) 0 cm   Wound Surface Area (cm^2) 1.05 cm^2   Change in Wound Size % -425   Wound Volume (cm^3) 0 cm^3   Wound Healing % 100   Wound Assessment   (dried exudate)   Drainage Amount None   Wound Odor None   Shivani-Wound/Incision Assessment Intact;Fragile   Edges Attached edges     Visit Vitals  BP (!) 110/53 (BP 1 Location: Left upper arm)   Pulse (!) 59   Temp 97.6 °F (36.4 °C)   Resp 16

## 2022-02-28 ENCOUNTER — HOSPITAL ENCOUNTER (OUTPATIENT)
Dept: WOUND CARE | Age: 87
Discharge: HOME | End: 2022-02-28
Payer: MEDICARE

## 2022-02-28 VITALS
DIASTOLIC BLOOD PRESSURE: 66 MMHG | SYSTOLIC BLOOD PRESSURE: 124 MMHG | RESPIRATION RATE: 18 BRPM | TEMPERATURE: 98.7 F | HEART RATE: 60 BPM

## 2022-02-28 DIAGNOSIS — L89.622 PRESSURE INJURY OF LEFT HEEL, STAGE 2 (HCC): ICD-10-CM

## 2022-02-28 DIAGNOSIS — L89.152 PRESSURE INJURY OF SACRAL REGION, STAGE 2 (HCC): ICD-10-CM

## 2022-02-28 DIAGNOSIS — E44.0 MODERATE PROTEIN-CALORIE MALNUTRITION (HCC): ICD-10-CM

## 2022-02-28 DIAGNOSIS — L89.613 PRESSURE INJURY OF RIGHT HEEL, STAGE 3 (HCC): ICD-10-CM

## 2022-02-28 PROCEDURE — 99213 OFFICE O/P EST LOW 20 MIN: CPT | Performed by: SURGERY

## 2022-02-28 PROCEDURE — 99213 OFFICE O/P EST LOW 20 MIN: CPT

## 2022-02-28 PROCEDURE — 74011000250 HC RX REV CODE- 250: Performed by: SURGERY

## 2022-02-28 RX ORDER — LIDOCAINE HYDROCHLORIDE 20 MG/ML
JELLY TOPICAL AS NEEDED
Status: DISCONTINUED | OUTPATIENT
Start: 2022-02-28 | End: 2022-03-02 | Stop reason: HOSPADM

## 2022-02-28 RX ADMIN — LIDOCAINE HYDROCHLORIDE: 20 JELLY TOPICAL at 13:53

## 2022-02-28 NOTE — PROGRESS NOTES
HISTORY OF PRESENT ILLNESS:  The patient is an 42-year-old man who was referred to [address] [place] [address] regarding a pressure ulcer on the right heel.  The patient was hospitalized in 04/2021 and underwent transfemoral heart valve clipping.  He is reported to have developed the pressure site at that time.     The patient was first seen at [facility] on 8/23/2021.  He was last seen 10/25/2021, then hospitalized at [facility] for edema and cardiac issues. Erlin Flores was apparently there for a number of weeks. Erlin Flores had reduction in swelling with diuresis, but his wife says he has regained much of the swelling.  In the hospital, his right heel ulcer worsened. Seen again 12/13/2021.     He is taking lasix 40 bid and metolazone 5 mg MWF.     Presently, the patient can walk minimally with PT in his house with a walker.   The patient is fairly weak.  His appetite is decreased.     The patient does not have history of diabetes. Erlin Flores does have history of congestive heart failure.  He also has history of MI about 7 years ago and had coronary angioplasty at that time. Erlin Flores is reported to have severe coronary artery disease.  The patient does have history of pacemaker and defibrillator insertion.  His cardiologist is Dr Bandar Poole. The patient has never smoked. He has chronic kidney disease.     The patient is able to sleep in bed at night.  He sleeps on his sides because of sacral discomfort.   He has been instructed to elevate his legs much of the time during the day because of leg swelling.     The patient reportedly has some type of padded boot for both feet, which he wears during the day.  At night, the patient reports he has neuropathy which prevents him from using any padded boot. The patient's wife is floating the heels.     He spends a lot of time in a recliner; there he is on his back in the recliner.     His wife performed excellent wound care in the past and was very fastidious about offloading.     Dressings as of 12/27/2021:  Right heel -  Apply Santyl nickel thick over the right heel ulcer, cover with normal saline and moist gauze and dry gauze and roll gauze from below ankle to above ankle.  Dressing to be changed daily.                 Dressings ordered left heel:  Foam border dressing, change 3 times per week.                 Dressing for sacral region:  Heart shaped sacral foam dressing; change every 2-3            days and prn.  -  Home Health sent much smaller foam dressings.                          Reported weight 135 pounds, height 5 feet 9 inches.     PHYSICAL EXAMINATION:     GENERAL:  The patient is an alert and elderly man who appears weak and chronically ill,  in no acute distress.       EXTREMITIES:          Sacral region - On right posterior buttock, shallow ulcer 0.4 x 0.6 x 0.1 cm partial thickness with pink base. Surrounding skin dry.     Examination of the left lower extremity reveals 2+ dorsalis pedis pulse.  1 + pitting edema from knee down.  There is a pink wound, 0.4 x 0.4 x 0.1 cm.     Examination of the right lower extremity revealed 1 + pitting edema from knee down. Merla Bloch were 2+ right dorsalis pedis and posterior tibial pulses.  There was a wound on the posterior right heel, which was 2 x 2.5 x 0.3 cm in dimension with granulation 500% and mild slough.                   Right heel pressure wound stage 3.  Pressure injury stage 2 left heel.  Bilateral leg edema.    Sacral pressure injury stage 2.                 Offload both heals.  The patient's wife has done a good job with this.     Offload sacrum - avoid lying on back in recliner.  If in recliner, tilt off midline at least 30 degrees.   Sit directly upright on foam pad (which he has) or lie turned toward side 45-90 degrees.  Change position at least every 2 hours.     Dressing ordered:  Right heel  -  Apply Santyl nickel thick over the right heel ulcer, cover with normal saline and moist gauze and dry gauze and roll gauze from below ankle to above ankle.  Dressing to be changed daily.                                         Dressings ordered left heel:  Foam border dressing, change 3                                             times per week.                                         Dressing for sacral region:  Xeroform over ulcerated area, heart                              shaped sacral foam dressing; change every 2-3 days and prn.     The patient is able to bathe with the dressings removed.     Home Health  three times per week for assistance with wound care.  The patient's wife will change the dressing on days when Home Health is not coming.     The patient will follow up in [address] [place] [address] in 4 weeks.     FINAL DIAGNOSIS:  Pressure ulcer posterior right heel, stage 3.  Pressure ulcer left heel stage 2, pressure injury sacrum stage  2.       L89.613,  L89.622, X15.222        Barabara Goldberg, MD

## 2022-02-28 NOTE — WOUND CARE
02/28/22 1346   Anesthetic   Anesthetic 2% Lidocaine Gel Topical   Right Leg Edema Point of Measurement   Leg circumference 29 cm   Ankle circumference 26.5 cm   Left Leg Edema Point of Measurement   Leg circumference 29 cm   Ankle circumference 26.5 cm   LLE Peripheral Vascular    Capillary Refill Less than/equal to 3 seconds   Color Appropriate for race   Temperature Cool   Pedal Pulse Palpable   RLE Peripheral Vascular    Capillary Refill Less than/equal to 3 seconds   Temperature Cool   Pedal Pulse Palpable   Wound Heel Left #8   Date First Assessed/Time First Assessed: 12/13/21 1500   Present on Hospital Admission: Yes  Location: Heel  Wound Location Orientation: Left  Wound Description: #8   Wound Image    Cleansed Cleansed with saline   Wound Length (cm) 0.4 cm   Wound Width (cm) 0.4 cm   Wound Depth (cm) 0.1 cm   Wound Surface Area (cm^2) 0.16 cm^2   Change in Wound Size % 20   Wound Volume (cm^3) 0.016 cm^3   Wound Healing % 20   Wound Assessment Slough;Pink/red   Drainage Amount Moderate   Drainage Description Serosanguinous   Wound Odor None   Shivani-Wound/Incision Assessment Blanchable erythema   Edges Flat/open edges   Wound Heel Lateral;Right #1 08/23/21   Date First Assessed/Time First Assessed: 08/23/21 1431   Present on Hospital Admission: Yes  Wound Approximate Age at First Assessment (Weeks): (c)   Primary Wound Type: Pressure Injury  Location: Heel  Wound Location Orientation: Lateral;Right  Wound. ..    Wound Image    Cleansed Cleansed with saline   Wound Length (cm) 2 cm   Wound Width (cm) 2.5 cm   Wound Depth (cm) 0.3 cm   Wound Surface Area (cm^2) 5 cm^2   Change in Wound Size % 33.33   Wound Volume (cm^3) 1.5 cm^3   Wound Healing % 50   Wound Assessment [place]/red;Slough   Drainage Amount Moderate   Drainage Description Serosanguinous   Wound Odor None   Shivnai-Wound/Incision Assessment Blanchable erythema   Edges Flat/open edges   Wound Buttocks Right #2   Date First Assessed/Time First Assessed: 01/31/22 1339   Present on Hospital Admission: Yes  Primary Wound Type: Skin Tear  Location: Buttocks  Wound Location Orientation: Right  Wound Description: #2   Wound Image    Cleansed Cleansed with saline   Wound Length (cm) 0.4 cm   Wound Width (cm) 0.6 cm   Wound Depth (cm) 0.1 cm   Wound Surface Area (cm^2) 0.24 cm^2   Change in Wound Size % 4   Wound Volume (cm^3) 0.024 cm^3   Wound Healing % 4   Wound Assessment [place]/red;Slough   Drainage Amount Small   Drainage Description Serosanguinous   Wound Odor None   Shivani-Wound/Incision Assessment Blanchable erythema   Edges Flat/open edges     Visit Vitals  /66 (BP 1 Location: Left upper arm)   Pulse 60   Temp 98.7 °F (37.1 °C)   Resp 18

## 2022-02-28 NOTE — DISCHARGE INSTRUCTIONS
Discharge Instructions for  [facility]  [address] [place], [address] [facility]  Telephone: [phone]    NAME:  Chris Bridges OF BIRTH:  1934  DATE:  1/31/2021    [x] Home Healthcare: TO:  ALL ABOUT CARE       Utilize heel boots to offload heels if they do not press on wound, may use pillow to float heel as well    Reduce pressure to buttocks and sacrum by shifting weight and turning from side to side- may tuck a pillow under one hip and shift every to 2 hours. Sit upright instead of reclining. Wound Cleansing:   Do not scrub or use excessive force. DO NOT SOAK  Cleanse wound prior to applying a clean dressing with:  [x] Cleanse wound with Mild Soap & Water    [x] May Shower at Discharge: remove dressing 1st, redress wound right after    Topical Treatments:  Do not apply lotions, creams, or ointments to wound bed unless directed. [x] Apply moisturizing lotion A&D ointment to skin surrounding the wound prior to dressing change. Dressings:           Wound Location Sacrum     Apply Primary Dressing:       [x] Xeroform, Allevyn gentle boarder heart shaped please, (do not cut) 3 times a week. Dressings:                Wound Location Left Heel           Apply Primary Dressing:      Cover and Secure with:           [x] Mepilex Border 3 times a week         Dressings:                   Wound Location Right Lateral Heel     Apply Primary Dressing:      [x] Santyl - nickel thick     Cover and Secure with:  [x] Gauze moistened with saline, then Dry Gauze on top [] ABD [] exudry     [] Mauricio [x] Kerlix/Rolled Gauze [] Mepilex Border  [] Ace Wrap [x] Roll Tape    [] Other:        Change dressing:   [x] Daily       [] Every Other Day   [] Three times per week  [] Once a week   [] Do Not Change Dressing     [x] Other: three times a week with home health    [x] Elevate leg(s) above the level of the heart when sitting.    [x] Avoid prolonged standing in one place. Off-Loading:   [x] Off-loading when [] walking  [x] in bed [x] sitting       HEEL BOOTS OR FLOAT ON PILLOWS- heel should not touch bed or ottoman at all       Shift body weight while in recliner and in bed, try to sit upright when reclining    Dietary:  [x] Diet as tolerated   [x] Increase Protein: examples (Meat, cheese, eggs, greek yogurt, fish, nuts)   [] Miguel Angel Therapeutic Nutrition Powder  [x] Other: Bowl of ice cream every afternoon per Dr. Nelson Lea to increase body weight and nutritional status  [] Dial a Dietician : Call [employer] at [phone] enter code ([phone]) when prompted. M-F 9am-5pm EST. Return Appointment:  [] Nurse Visit at wound center in *** days   [x] Return Appointment: With Dr. Cari Acosta in 4 weeks  [] Ordered tests:      Electronically signed on 1/31/2021    [facility] Information: Should you experience any significant changes in your wound(s) or have questions about your wound care, please contact the [facility] Main at [address] [place] [address] 8:00 am - 4:30. If you need help with your wound outside these hours and cannot wait until we are again available, contact your PCP or go to the hospital emergency room. PLEASE NOTE: IF YOU ARE UNABLE TO OBTAIN WOUND SUPPLIES, CONTINUE TO USE THE SUPPLIES YOU HAVE AVAILABLE UNTIL YOU ARE ABLE TO REACH US. IT IS MOST IMPORTANT TO KEEP THE WOUND COVERED AT ALL TIMES.      Physician Signature:_______________________  Dr. Cari Acosta

## 2022-02-28 NOTE — WOUND CARE
02/28/22 1433   Wound Heel Left #8   Date First Assessed/Time First Assessed: 12/13/21 1500   Present on Hospital Admission: Yes  Location: Heel  Wound Location Orientation: Left  Wound Description: #8   Dressing Status New dressing applied   Dressing/Treatment Foam   Wound Heel Lateral;Right #1 08/23/21   Date First Assessed/Time First Assessed: 08/23/21 1431   Present on Hospital Admission: Yes  Wound Approximate Age at First Assessment (Weeks): (c)   Primary Wound Type: Pressure Injury  Location: Heel  Wound Location Orientation: Lateral;Right  Wound. .. Dressing Status New dressing applied   Dressing/Treatment Other (Comment);Gauze dressing/dressing sponge;Roll gauze;Tape/Soft cloth adhesive tape  (santyl)   Wound Buttocks Right #2   Date First Assessed/Time First Assessed: 01/31/22 1339   Present on Hospital Admission: Yes  Primary Wound Type: Skin Tear  Location: Buttocks  Wound Location Orientation: Right  Wound Description: #2   Dressing Status New dressing applied   Dressing/Treatment Xeroform; Foam   Discharge Condition: Stable     Pain: 0    Ambulatory Status: Walking, walker    Discharge Destination: Home     Transportation: Car    Accompanied by: Self  and Family/Caregiver     Discharge instructions reviewed with Patient and Family/Caregiver  and copy or written instructions have been provided. All questions/concerns have been addressed at this time.

## 2022-03-28 RX ORDER — COLLAGENASE SANTYL 250 [ARB'U]/G
OINTMENT TOPICAL
Qty: 60 G | Refills: 1 | Status: SHIPPED | OUTPATIENT
Start: 2022-03-28

## 2022-04-20 NOTE — DISCHARGE INSTRUCTIONS
Discharge Instructions for  [facility]  [address] [place], [address] [address]  Telephone: [phone]    NAME:  Mingo Lane OF BIRTH:  1934  DATE:  2/28/2022    [x] Home Healthcare: TO:  ALL ABOUT CARE       Utilize heel boots to offload heels if they do not press on wound, may use pillow to float heel as well    Reduce pressure to buttocks and sacrum by shifting weight and turning from side to side- may tuck a pillow under one hip and shift every to 2 hours. Sit upright instead of reclining. Wound Cleansing:   Do not scrub or use excessive force. DO NOT SOAK  Cleanse wound prior to applying a clean dressing with:  [x] Cleanse wound with Mild Soap & Water    [x] May Shower at Discharge: remove dressing 1st, redress wound right after    Topical Treatments:  Do not apply lotions, creams, or ointments to wound bed unless directed. [x] Apply moisturizing lotion A&D ointment to skin surrounding the wound prior to dressing change. Dressings:           Wound Location Sacrum     Apply Primary Dressing:       [x] Xeroform, Allevyn gentle boarder heart shaped please, (do not cut) 3 times a week. Dressings:                Wound Location Left Heel           Apply Primary Dressing:      Cover and Secure with:           [x] Mepilex Border 3 times a week         Dressings:                   Wound Location Right Lateral Heel     Apply Primary Dressing:      [x] Santyl - nickel thick     Cover and Secure with:  [x] Gauze moistened with saline, then Dry Gauze on top [] ABD [] exudry     [] Mauricio [x] Kerlix/Rolled Gauze [] Mepilex Border  [] Ace Wrap [x] Roll Tape    [] Other:        Change dressing:   [x] Daily       [] Every Other Day   [] Three times per week  [] Once a week   [] Do Not Change Dressing     [x] Other: three times a week with home health    [x] Elevate leg(s) above the level of the heart when sitting.    [x] Avoid prolonged standing in one place. Off-Loading:   [x] Off-loading when [] walking  [x] in bed [x] sitting       HEEL BOOTS OR FLOAT ON PILLOWS- heel should not touch bed or ottoman at all       Shift body weight while in recliner and in bed, try to sit upright when reclining    Dietary:  [x] Diet as tolerated   [x] Increase Protein: examples (Meat, cheese, eggs, greek yogurt, fish, nuts)   [x] Miguel Angel Therapeutic Nutrition Powder-samples given  [x] Other: Bowl of ice cream every afternoon per Dr. Nery Arias to increase body weight and nutritional status  [] Dial a Dietician : Call [employer] at [phone] enter code ([phone]) when prompted. M-F 9am-5pm EST. For Loose stool:  Imodium over the counter, 30 ml one time then 15 ml after each loose stool, max dose 60 ml/day      Return Appointment:  [] Nurse Visit at wound center in *** days   [x] Return Appointment: With Dr. Hosea Wolff in 4 weeks  [] Ordered tests:      Electronically signed on 2/28/2022    [address] [place] [address] Information: Should you experience any significant changes in your wound(s) or have questions about your wound care, please contact the [facility] at [address] [place] [address] Street 8:00 am - 4:30. If you need help with your wound outside these hours and cannot wait until we are again available, contact your PCP or go to the hospital emergency room. PLEASE NOTE: IF YOU ARE UNABLE TO OBTAIN WOUND SUPPLIES, CONTINUE TO USE THE SUPPLIES YOU HAVE AVAILABLE UNTIL YOU ARE ABLE TO REACH US. IT IS MOST IMPORTANT TO KEEP THE WOUND COVERED AT ALL TIMES.      Physician Signature:_______________________  Dr. Hosea Wolff
